# Patient Record
Sex: FEMALE | Race: WHITE | NOT HISPANIC OR LATINO | Employment: OTHER | URBAN - METROPOLITAN AREA
[De-identification: names, ages, dates, MRNs, and addresses within clinical notes are randomized per-mention and may not be internally consistent; named-entity substitution may affect disease eponyms.]

---

## 2017-04-11 ENCOUNTER — OFFICE VISIT - HEALTHEAST (OUTPATIENT)
Dept: INTERNAL MEDICINE | Facility: CLINIC | Age: 52
End: 2017-04-11

## 2017-04-11 DIAGNOSIS — M62.830 BACK MUSCLE SPASM: ICD-10-CM

## 2017-04-14 ENCOUNTER — OFFICE VISIT - HEALTHEAST (OUTPATIENT)
Dept: PHYSICAL THERAPY | Facility: REHABILITATION | Age: 52
End: 2017-04-14

## 2017-04-14 DIAGNOSIS — M62.81 MUSCLE WEAKNESS (GENERALIZED): ICD-10-CM

## 2017-04-14 DIAGNOSIS — M62.830 BACK MUSCLE SPASM: ICD-10-CM

## 2017-04-14 DIAGNOSIS — M67.00 TIGHTNESS OF HEEL CORD, UNSPECIFIED LATERALITY: ICD-10-CM

## 2017-04-14 DIAGNOSIS — M24.559 HIP FLEXOR TIGHTNESS, UNSPECIFIED LATERALITY: ICD-10-CM

## 2017-04-18 ENCOUNTER — OFFICE VISIT - HEALTHEAST (OUTPATIENT)
Dept: PHYSICAL THERAPY | Facility: REHABILITATION | Age: 52
End: 2017-04-18

## 2017-04-18 DIAGNOSIS — M24.559 HIP FLEXOR TIGHTNESS, UNSPECIFIED LATERALITY: ICD-10-CM

## 2017-04-18 DIAGNOSIS — M67.00 TIGHTNESS OF HEEL CORD, UNSPECIFIED LATERALITY: ICD-10-CM

## 2017-04-18 DIAGNOSIS — M62.830 BACK MUSCLE SPASM: ICD-10-CM

## 2017-04-18 DIAGNOSIS — M62.81 MUSCLE WEAKNESS (GENERALIZED): ICD-10-CM

## 2017-04-25 ENCOUNTER — OFFICE VISIT - HEALTHEAST (OUTPATIENT)
Dept: PHYSICAL THERAPY | Facility: REHABILITATION | Age: 52
End: 2017-04-25

## 2017-04-25 DIAGNOSIS — M62.830 BACK MUSCLE SPASM: ICD-10-CM

## 2017-04-25 DIAGNOSIS — M62.81 MUSCLE WEAKNESS (GENERALIZED): ICD-10-CM

## 2017-04-25 DIAGNOSIS — M67.00 TIGHTNESS OF HEEL CORD, UNSPECIFIED LATERALITY: ICD-10-CM

## 2017-04-25 DIAGNOSIS — M24.559 HIP FLEXOR TIGHTNESS, UNSPECIFIED LATERALITY: ICD-10-CM

## 2017-06-26 ENCOUNTER — COMMUNICATION - HEALTHEAST (OUTPATIENT)
Dept: INTERNAL MEDICINE | Facility: CLINIC | Age: 52
End: 2017-06-26

## 2017-06-26 DIAGNOSIS — E78.5 HYPERLIPIDEMIA: ICD-10-CM

## 2017-10-16 ENCOUNTER — HOSPITAL ENCOUNTER (OUTPATIENT)
Dept: MAMMOGRAPHY | Facility: HOSPITAL | Age: 52
Discharge: HOME | End: 2017-10-16
Attending: INTERNAL MEDICINE

## 2017-10-16 DIAGNOSIS — Z12.31 VISIT FOR SCREENING MAMMOGRAM: ICD-10-CM

## 2017-10-17 ENCOUNTER — OFFICE VISIT - HEALTHEAST (OUTPATIENT)
Dept: INTERNAL MEDICINE | Facility: CLINIC | Age: 52
End: 2017-10-17

## 2017-10-17 ENCOUNTER — COMMUNICATION - HEALTHEAST (OUTPATIENT)
Dept: INTERNAL MEDICINE | Facility: CLINIC | Age: 52
End: 2017-10-17

## 2017-10-17 DIAGNOSIS — Z00.00 PREVENTATIVE HEALTH CARE: ICD-10-CM

## 2017-10-17 DIAGNOSIS — E78.00 HYPERCHOLESTEROLEMIA: ICD-10-CM

## 2017-10-17 DIAGNOSIS — Z51.81 MEDICATION MONITORING ENCOUNTER: ICD-10-CM

## 2017-10-17 LAB
CHOLEST SERPL-MCNC: 200 MG/DL
FASTING STATUS PATIENT QL REPORTED: YES
HDLC SERPL-MCNC: 78 MG/DL
LDLC SERPL CALC-MCNC: 106 MG/DL
TRIGL SERPL-MCNC: 81 MG/DL

## 2017-10-17 ASSESSMENT — MIFFLIN-ST. JEOR: SCORE: 1160.14

## 2017-10-18 ENCOUNTER — AMBULATORY - HEALTHEAST (OUTPATIENT)
Dept: INTERNAL MEDICINE | Facility: CLINIC | Age: 52
End: 2017-10-18

## 2017-10-20 LAB
HPV INTERPRETATION - HISTORICAL: NORMAL
HPV INTERPRETER - HISTORICAL: NORMAL

## 2017-10-23 LAB
BKR LAB AP ABNORMAL BLEEDING: NO
BKR LAB AP BIRTH CONTROL/HORMONES: NORMAL
BKR LAB AP CERVICAL APPEARANCE: NORMAL
BKR LAB AP GYN ADEQUACY: NORMAL
BKR LAB AP GYN INTERPRETATION: NORMAL
BKR LAB AP HPV REFLEX: NORMAL
BKR LAB AP LMP: NORMAL
BKR LAB AP PATIENT STATUS: NO
BKR LAB AP PREVIOUS ABNORMAL: NORMAL
BKR LAB AP PREVIOUS NORMAL: 2014
HIGH RISK?: NO
PATH REPORT.COMMENTS IMP SPEC: NORMAL
RESULT FLAG (HE HISTORICAL CONVERSION): NORMAL

## 2018-01-08 ENCOUNTER — RECORDS - HEALTHEAST (OUTPATIENT)
Dept: ADMINISTRATIVE | Facility: OTHER | Age: 53
End: 2018-01-08

## 2018-03-29 ENCOUNTER — RECORDS - HEALTHEAST (OUTPATIENT)
Dept: ADMINISTRATIVE | Facility: OTHER | Age: 53
End: 2018-03-29

## 2018-07-10 ENCOUNTER — COMMUNICATION - HEALTHEAST (OUTPATIENT)
Dept: INTERNAL MEDICINE | Facility: CLINIC | Age: 53
End: 2018-07-10

## 2018-08-19 ENCOUNTER — COMMUNICATION - HEALTHEAST (OUTPATIENT)
Dept: INTERNAL MEDICINE | Facility: CLINIC | Age: 53
End: 2018-08-19

## 2018-08-19 DIAGNOSIS — E78.5 HYPERLIPIDEMIA: ICD-10-CM

## 2018-08-19 DIAGNOSIS — E78.00 HYPERCHOLESTEROLEMIA: ICD-10-CM

## 2018-10-04 ENCOUNTER — RECORDS - HEALTHEAST (OUTPATIENT)
Dept: ADMINISTRATIVE | Facility: OTHER | Age: 53
End: 2018-10-04

## 2018-10-09 ENCOUNTER — OFFICE VISIT - HEALTHEAST (OUTPATIENT)
Dept: INTERNAL MEDICINE | Facility: CLINIC | Age: 53
End: 2018-10-09

## 2018-10-09 DIAGNOSIS — E78.00 HYPERCHOLESTEROLEMIA: ICD-10-CM

## 2018-10-09 DIAGNOSIS — Z00.00 PREVENTATIVE HEALTH CARE: ICD-10-CM

## 2018-10-09 LAB
ALBUMIN SERPL-MCNC: 4.1 G/DL (ref 3.5–5)
ALP SERPL-CCNC: 60 U/L (ref 45–120)
ALT SERPL W P-5'-P-CCNC: 16 U/L (ref 0–45)
ANION GAP SERPL CALCULATED.3IONS-SCNC: 9 MMOL/L (ref 5–18)
AST SERPL W P-5'-P-CCNC: 20 U/L (ref 0–40)
BILIRUB SERPL-MCNC: 0.9 MG/DL (ref 0–1)
BUN SERPL-MCNC: 14 MG/DL (ref 8–22)
CALCIUM SERPL-MCNC: 9.9 MG/DL (ref 8.5–10.5)
CHLORIDE BLD-SCNC: 104 MMOL/L (ref 98–107)
CHOLEST SERPL-MCNC: 213 MG/DL
CO2 SERPL-SCNC: 29 MMOL/L (ref 22–31)
CREAT SERPL-MCNC: 0.79 MG/DL (ref 0.6–1.1)
ERYTHROCYTE [DISTWIDTH] IN BLOOD BY AUTOMATED COUNT: 11.6 % (ref 11–14.5)
FASTING STATUS PATIENT QL REPORTED: YES
GFR SERPL CREATININE-BSD FRML MDRD: >60 ML/MIN/1.73M2
GLUCOSE BLD-MCNC: 86 MG/DL (ref 70–125)
HCT VFR BLD AUTO: 40.6 % (ref 35–47)
HDLC SERPL-MCNC: 86 MG/DL
HGB BLD-MCNC: 13.8 G/DL (ref 12–16)
LDLC SERPL CALC-MCNC: 109 MG/DL
MCH RBC QN AUTO: 31.1 PG (ref 27–34)
MCHC RBC AUTO-ENTMCNC: 34.1 G/DL (ref 32–36)
MCV RBC AUTO: 91 FL (ref 80–100)
PLATELET # BLD AUTO: 210 THOU/UL (ref 140–440)
PMV BLD AUTO: 8.5 FL (ref 7–10)
POTASSIUM BLD-SCNC: 4.4 MMOL/L (ref 3.5–5)
PROT SERPL-MCNC: 6.8 G/DL (ref 6–8)
RBC # BLD AUTO: 4.45 MILL/UL (ref 3.8–5.4)
SODIUM SERPL-SCNC: 142 MMOL/L (ref 136–145)
TRIGL SERPL-MCNC: 91 MG/DL
WBC: 6.4 THOU/UL (ref 4–11)

## 2018-10-09 ASSESSMENT — MIFFLIN-ST. JEOR: SCORE: 1167.4

## 2018-10-10 LAB
25(OH)D3 SERPL-MCNC: 40.7 NG/ML (ref 30–80)
25(OH)D3 SERPL-MCNC: 40.7 NG/ML (ref 30–80)

## 2018-11-25 ENCOUNTER — COMMUNICATION - HEALTHEAST (OUTPATIENT)
Dept: INTERNAL MEDICINE | Facility: CLINIC | Age: 53
End: 2018-11-25

## 2018-11-25 DIAGNOSIS — E78.5 HYPERLIPIDEMIA: ICD-10-CM

## 2018-12-05 ENCOUNTER — HOSPITAL ENCOUNTER (OUTPATIENT)
Dept: MAMMOGRAPHY | Facility: CLINIC | Age: 53
Discharge: HOME | End: 2018-12-05
Attending: INTERNAL MEDICINE

## 2018-12-05 DIAGNOSIS — Z12.31 VISIT FOR SCREENING MAMMOGRAM: ICD-10-CM

## 2019-04-02 ENCOUNTER — RECORDS - HEALTHEAST (OUTPATIENT)
Dept: ADMINISTRATIVE | Facility: OTHER | Age: 54
End: 2019-04-02

## 2019-04-05 ENCOUNTER — RECORDS - HEALTHEAST (OUTPATIENT)
Dept: ADMINISTRATIVE | Facility: OTHER | Age: 54
End: 2019-04-05

## 2019-10-11 ENCOUNTER — AMBULATORY - HEALTHEAST (OUTPATIENT)
Dept: SCHEDULING | Facility: CLINIC | Age: 54
End: 2019-10-11

## 2019-10-11 ENCOUNTER — OFFICE VISIT - HEALTHEAST (OUTPATIENT)
Dept: INTERNAL MEDICINE | Facility: CLINIC | Age: 54
End: 2019-10-11

## 2019-10-11 DIAGNOSIS — M85.80 LOW BONE MASS: ICD-10-CM

## 2019-10-11 DIAGNOSIS — Z00.00 ENCOUNTER FOR PREVENTIVE HEALTH EXAMINATION: ICD-10-CM

## 2019-10-11 DIAGNOSIS — E78.00 HYPERCHOLESTEROLEMIA: ICD-10-CM

## 2019-10-11 LAB
ALBUMIN SERPL-MCNC: 4.2 G/DL (ref 3.5–5)
ALP SERPL-CCNC: 76 U/L (ref 45–120)
ALT SERPL W P-5'-P-CCNC: 14 U/L (ref 0–45)
ANION GAP SERPL CALCULATED.3IONS-SCNC: 10 MMOL/L (ref 5–18)
AST SERPL W P-5'-P-CCNC: 21 U/L (ref 0–40)
BILIRUB SERPL-MCNC: 0.7 MG/DL (ref 0–1)
BUN SERPL-MCNC: 13 MG/DL (ref 8–22)
CALCIUM SERPL-MCNC: 10.5 MG/DL (ref 8.5–10.5)
CHLORIDE BLD-SCNC: 104 MMOL/L (ref 98–107)
CHOLEST SERPL-MCNC: 199 MG/DL
CO2 SERPL-SCNC: 28 MMOL/L (ref 22–31)
CREAT SERPL-MCNC: 0.88 MG/DL (ref 0.6–1.1)
ERYTHROCYTE [DISTWIDTH] IN BLOOD BY AUTOMATED COUNT: 11.1 % (ref 11–14.5)
FASTING STATUS PATIENT QL REPORTED: YES
GFR SERPL CREATININE-BSD FRML MDRD: >60 ML/MIN/1.73M2
GLUCOSE BLD-MCNC: 106 MG/DL (ref 70–125)
HCT VFR BLD AUTO: 43.6 % (ref 35–47)
HDLC SERPL-MCNC: 79 MG/DL
HGB BLD-MCNC: 14.8 G/DL (ref 12–16)
LDLC SERPL CALC-MCNC: 101 MG/DL
MCH RBC QN AUTO: 31.9 PG (ref 27–34)
MCHC RBC AUTO-ENTMCNC: 33.8 G/DL (ref 32–36)
MCV RBC AUTO: 94 FL (ref 80–100)
PLATELET # BLD AUTO: 251 THOU/UL (ref 140–440)
PMV BLD AUTO: 8.3 FL (ref 7–10)
POTASSIUM BLD-SCNC: 4.3 MMOL/L (ref 3.5–5)
PROT SERPL-MCNC: 7.3 G/DL (ref 6–8)
RBC # BLD AUTO: 4.63 MILL/UL (ref 3.8–5.4)
SODIUM SERPL-SCNC: 142 MMOL/L (ref 136–145)
TRIGL SERPL-MCNC: 95 MG/DL
WBC: 8.6 THOU/UL (ref 4–11)

## 2019-10-11 ASSESSMENT — MIFFLIN-ST. JEOR: SCORE: 1151.64

## 2019-10-14 LAB
25(OH)D3 SERPL-MCNC: 55.7 NG/ML (ref 30–80)
25(OH)D3 SERPL-MCNC: 55.7 NG/ML (ref 30–80)

## 2019-12-15 ENCOUNTER — COMMUNICATION - HEALTHEAST (OUTPATIENT)
Dept: INTERNAL MEDICINE | Facility: CLINIC | Age: 54
End: 2019-12-15

## 2019-12-15 DIAGNOSIS — E78.5 HYPERLIPIDEMIA: ICD-10-CM

## 2019-12-26 ENCOUNTER — HOSPITAL ENCOUNTER (OUTPATIENT)
Dept: MAMMOGRAPHY | Facility: CLINIC | Age: 54
Discharge: HOME | End: 2019-12-26
Attending: INTERNAL MEDICINE

## 2019-12-26 DIAGNOSIS — Z12.31 VISIT FOR SCREENING MAMMOGRAM: ICD-10-CM

## 2020-10-02 ENCOUNTER — COMMUNICATION - HEALTHEAST (OUTPATIENT)
Dept: INTERNAL MEDICINE | Facility: CLINIC | Age: 55
End: 2020-10-02

## 2020-10-02 ENCOUNTER — OFFICE VISIT - HEALTHEAST (OUTPATIENT)
Dept: INTERNAL MEDICINE | Facility: CLINIC | Age: 55
End: 2020-10-02

## 2020-10-02 DIAGNOSIS — Z11.59 NEED FOR HEPATITIS C SCREENING TEST: ICD-10-CM

## 2020-10-02 DIAGNOSIS — Z00.00 ENCOUNTER FOR PREVENTIVE CARE: ICD-10-CM

## 2020-10-02 DIAGNOSIS — Z11.4 ENCOUNTER FOR SCREENING FOR HIV: ICD-10-CM

## 2020-10-02 DIAGNOSIS — E78.00 HYPERCHOLESTEROLEMIA: ICD-10-CM

## 2020-10-02 LAB
ALBUMIN SERPL-MCNC: 4.2 G/DL (ref 3.5–5)
ALP SERPL-CCNC: 54 U/L (ref 45–120)
ALT SERPL W P-5'-P-CCNC: 17 U/L (ref 0–45)
ANION GAP SERPL CALCULATED.3IONS-SCNC: 9 MMOL/L (ref 5–18)
AST SERPL W P-5'-P-CCNC: 23 U/L (ref 0–40)
BILIRUB SERPL-MCNC: 0.5 MG/DL (ref 0–1)
BUN SERPL-MCNC: 18 MG/DL (ref 8–22)
CALCIUM SERPL-MCNC: 9.4 MG/DL (ref 8.5–10.5)
CHLORIDE BLD-SCNC: 105 MMOL/L (ref 98–107)
CHOLEST SERPL-MCNC: 225 MG/DL
CO2 SERPL-SCNC: 28 MMOL/L (ref 22–31)
CREAT SERPL-MCNC: 0.86 MG/DL (ref 0.6–1.1)
ERYTHROCYTE [DISTWIDTH] IN BLOOD BY AUTOMATED COUNT: 12 % (ref 11–14.5)
FASTING STATUS PATIENT QL REPORTED: YES
GFR SERPL CREATININE-BSD FRML MDRD: >60 ML/MIN/1.73M2
GLUCOSE BLD-MCNC: 86 MG/DL (ref 70–125)
HCT VFR BLD AUTO: 44 % (ref 35–47)
HCV AB SERPL QL IA: NEGATIVE
HDLC SERPL-MCNC: 88 MG/DL
HGB BLD-MCNC: 14.4 G/DL (ref 12–16)
HIV 1+2 AB+HIV1 P24 AG SERPL QL IA: NEGATIVE
LDLC SERPL CALC-MCNC: 119 MG/DL
MCH RBC QN AUTO: 30.8 PG (ref 27–34)
MCHC RBC AUTO-ENTMCNC: 32.8 G/DL (ref 32–36)
MCV RBC AUTO: 94 FL (ref 80–100)
PLATELET # BLD AUTO: 239 THOU/UL (ref 140–440)
PMV BLD AUTO: 8.3 FL (ref 7–10)
POTASSIUM BLD-SCNC: 4.8 MMOL/L (ref 3.5–5)
PROT SERPL-MCNC: 6.8 G/DL (ref 6–8)
RBC # BLD AUTO: 4.69 MILL/UL (ref 3.8–5.4)
SODIUM SERPL-SCNC: 142 MMOL/L (ref 136–145)
TRIGL SERPL-MCNC: 92 MG/DL
TSH SERPL DL<=0.005 MIU/L-ACNC: 1.48 UIU/ML (ref 0.3–5)
WBC: 5.7 THOU/UL (ref 4–11)

## 2020-10-02 RX ORDER — TRETINOIN 0.5 MG/G
CREAM TOPICAL
Status: SHIPPED | COMMUNITY
Start: 2020-09-23 | End: 2022-03-18

## 2020-10-02 ASSESSMENT — MIFFLIN-ST. JEOR: SCORE: 1122.15

## 2020-10-05 LAB
25(OH)D3 SERPL-MCNC: 52.4 NG/ML (ref 30–80)
25(OH)D3 SERPL-MCNC: 52.4 NG/ML (ref 30–80)

## 2020-12-18 ENCOUNTER — COMMUNICATION - HEALTHEAST (OUTPATIENT)
Dept: INTERNAL MEDICINE | Facility: CLINIC | Age: 55
End: 2020-12-18

## 2020-12-18 DIAGNOSIS — E78.5 HYPERLIPIDEMIA: ICD-10-CM

## 2020-12-21 RX ORDER — ATORVASTATIN CALCIUM 10 MG/1
TABLET, FILM COATED ORAL
Qty: 90 TABLET | Refills: 3 | Status: SHIPPED | OUTPATIENT
Start: 2020-12-21 | End: 2022-02-01

## 2020-12-28 ENCOUNTER — HOSPITAL ENCOUNTER (OUTPATIENT)
Dept: MAMMOGRAPHY | Facility: CLINIC | Age: 55
Discharge: HOME | End: 2020-12-28
Attending: INTERNAL MEDICINE

## 2020-12-28 DIAGNOSIS — Z12.31 VISIT FOR SCREENING MAMMOGRAM: ICD-10-CM

## 2021-04-23 ENCOUNTER — COMMUNICATION - HEALTHEAST (OUTPATIENT)
Dept: INTERNAL MEDICINE | Facility: CLINIC | Age: 56
End: 2021-04-23

## 2021-05-27 ENCOUNTER — RECORDS - HEALTHEAST (OUTPATIENT)
Dept: ADMINISTRATIVE | Facility: CLINIC | Age: 56
End: 2021-05-27

## 2021-05-28 ENCOUNTER — RECORDS - HEALTHEAST (OUTPATIENT)
Dept: ADMINISTRATIVE | Facility: CLINIC | Age: 56
End: 2021-05-28

## 2021-05-29 ENCOUNTER — RECORDS - HEALTHEAST (OUTPATIENT)
Dept: ADMINISTRATIVE | Facility: CLINIC | Age: 56
End: 2021-05-29

## 2021-05-30 ENCOUNTER — RECORDS - HEALTHEAST (OUTPATIENT)
Dept: ADMINISTRATIVE | Facility: CLINIC | Age: 56
End: 2021-05-30

## 2021-05-30 VITALS — BODY MASS INDEX: 25.58 KG/M2 | WEIGHT: 137.6 LBS

## 2021-05-31 VITALS — HEIGHT: 62 IN | WEIGHT: 136 LBS | BODY MASS INDEX: 25.03 KG/M2

## 2021-06-02 ENCOUNTER — RECORDS - HEALTHEAST (OUTPATIENT)
Dept: ADMINISTRATIVE | Facility: CLINIC | Age: 56
End: 2021-06-02

## 2021-06-02 VITALS — WEIGHT: 137.6 LBS | HEIGHT: 62 IN | BODY MASS INDEX: 25.32 KG/M2

## 2021-06-02 NOTE — PROGRESS NOTES
Assessment and Plan:     1. Encounter for preventive health examination  Up-to-date on mammography, colon cancer screening and immunizations.  Due for flu shot but has a cold.  Additionally, due for bone density within the next 1 to 2 years.  Lifestyle is healthy.  Weight is stable.  She enjoys jogging and running.  She is going to begin strength training.  Weight and diet are healthy.    2. Hypercholesterolemia  We will update labs  - HM2(CBC w/o Differential)  - Comprehensive Metabolic Panel  - Lipid Cascade FASTING    3. Low bone mass  Will update labs  - Vitamin D, Total (25-Hydroxy)  - DXA Bone Density Scan; Future      Annual physical    Lulazack Landrum MD  10/11/2019    Chief Complaint:  Chief Complaint   Patient presents with     Annual Exam       History of Present Illness:  Birtney is a 53 y.o. female who is here today for an annual physical examination.  Of note, in general, she enjoys excellent health.  She has no complaints today.  She does express concern regarding her daughter who is a patient of mine.  We will be meeting with Lalita on Tuesday of next week to discuss her issues.    There is no new family history.  Britney has no chief complaints.    Health maintenance is reviewed and updated in the chart    Lifestyle is reviewed: She is exercising regularly via jogging and walking.  She eats a healthful diet.      Review of Systems:   A comprehensive review was performed.  She denies any gynecologic symptoms, chest pain or shortness of breath.  There are no new bowel or bladder symptoms.  The rest of the review of systems are negative for all systems.    PFSH:  Social History:  with adult children.  She continues to work full-time  Social History     Tobacco Use   Smoking Status Never Smoker   Smokeless Tobacco Never Used       Past Medical History:   Past Medical History:   Diagnosis Date     Breast cyst 2010     Chest pain        Allergies   Allergen Reactions     Simvastatin        Family  "History: Her daughter has generalized anxiety but nothing else  Family History   Problem Relation Age of Onset     Lung cancer Father      Hyperlipidemia Mother      Hypertension Mother      Anxiety disorder Mother      Graves' disease Sister      ADD / ADHD Daughter      Depression Daughter        Physical Exam:  Vitals:    10/11/19 0834   BP: 102/74   Patient Site: Left Arm   Patient Position: Sitting   Cuff Size: Adult Regular   Pulse: 88   SpO2: 98%   Weight: 135 lb (61.2 kg)   Height: 5' 1.75\" (1.568 m)     Wt Readings from Last 3 Encounters:   10/11/19 135 lb (61.2 kg)   10/09/18 137 lb 9.6 oz (62.4 kg)   10/17/17 136 lb (61.7 kg)       General Appearance:  Alert, cooperative, no distress, appears stated age   Head:  Normocephalic, without obvious abnormality, atraumatic   Eyes:  PERRL, conjunctiva/corneas clear, EOM's intact   Ears:  Normal TM's and external ear canals, both ears   Nose: Nares normal, septum midline,mucosa normal, no drainage    Throat: Lips, mucosa, and tongue normal; teeth and gums normal   Neck: Supple, symmetrical, trachea midline, no adenopathy;  thyroid: not enlarged, symmetric, no tenderness/mass/nodules; no carotid bruit or JVD   Back:   Symmetric, no curvature, ROM normal, no CVA tenderness   Lungs:   Clear to auscultation bilaterally, respirations unlabored   Heart:  Regular rate and rhythm, S1 and S2 normal, no murmur, rub, or gallop   Abdomen:   Soft, non-tender, bowel sounds active all four quadrants,  no masses, no organomegaly, no hernias    Extremities: Extremities normal, atraumatic, no cyanosis or edema   Skin: Skin color, texture, turgor normal, no rashes or lesions   Lymph nodes: Cervical, supraclavicular, and axillary nodes normal   Neurologic: Normal, CN 2-12 intact  Breasts; within normal limits     Medications:  Current Outpatient Medications   Medication Sig Dispense Refill     aspirin 81 MG EC tablet Take 81 mg by mouth daily.       atorvastatin (LIPITOR) 10 MG " tablet TAKE 1 TABLET BY MOUTH ONCE DAILY 90 tablet 3     calcium citrate-vitamin D (CITRACAL+D) 315-200 mg-unit per tablet Take 1 tablet by mouth 2 (two) times a day.       multivitamin capsule Take 1 capsule by mouth daily.       omega-3 fatty acids-fish oil (FISH OIL) 360-1,200 mg cap Take 1 capsule by mouth daily.       No current facility-administered medications for this visit.        Immunizations:  Immunization History   Administered Date(s) Administered     DTP 04/01/1965, 08/05/1970, 11/30/1975, 07/30/1985     Hep A / Hep B 02/17/2014, 03/17/2014, 08/19/2014     INFLUENZA,RECOMBINANT,INJ,PF QUADRIVALENT 18+YRS 10/09/2018     Influenza, inj, historic,unspecified 11/01/2007, 10/18/2012, 09/11/2013     Influenza,seasonal,quad inj =/> 6months 10/14/2016, 10/17/2017     MMR 05/12/1966, 03/19/1990     OPV,Trivalent,Historic(4366-3905 only) 06/10/1965, 08/05/1970     Td, Adult, Absorbed 07/30/2003     Td, adult adsorbed, PF 10/09/2018     Td,adult,historic,unspecified 07/22/2008     Tdap 07/22/2008     Typhoid, Inj, Inactive 02/17/2014     ZOSTER, RECOMBINANT, IM 03/31/2019, 06/09/2019

## 2021-06-03 VITALS
DIASTOLIC BLOOD PRESSURE: 74 MMHG | WEIGHT: 135 LBS | HEART RATE: 88 BPM | BODY MASS INDEX: 24.84 KG/M2 | SYSTOLIC BLOOD PRESSURE: 102 MMHG | HEIGHT: 62 IN | OXYGEN SATURATION: 98 %

## 2021-06-04 NOTE — TELEPHONE ENCOUNTER
Refill Approved    Rx renewed per Medication Renewal Policy. Medication was last renewed on 11/27/18.    Kassie Dolan, [facility] Triage/Med Refill 12/16/2019     Requested Prescriptions   Pending Prescriptions Disp Refills     atorvastatin (LIPITOR) 10 MG tablet [Pharmacy Med Name: Atorvastatin Calcium Oral Tablet 10 MG] 90 tablet 2     Sig: TAKE 1 TABLET BY MOUTH ONCE DAILY       Statins Refill Protocol (Hmg CoA Reductase Inhibitors) Passed - 12/15/2019 11:18 AM        Passed - PCP or prescribing provider visit in past 12 months      Last office visit with prescriber/PCP: 4/11/2017 Lula Landrum MD OR same dept: Visit date not found OR same specialty: 4/11/2017 Lula Landrum MD  Last physical: 10/11/2019 Last MTM visit: Visit date not found   Next visit within 3 mo: Visit date not found  Next physical within 3 mo: Visit date not found  Prescriber OR PCP: Lula Landrum MD  Last diagnosis associated with med order: 1. Hyperlipidemia  - atorvastatin (LIPITOR) 10 MG tablet [Pharmacy Med Name: Atorvastatin Calcium Oral Tablet 10 MG]; TAKE 1 TABLET BY MOUTH ONCE DAILY  Dispense: 90 tablet; Refill: 2    If protocol passes may refill for 12 months if within 3 months of last provider visit (or a total of 15 months).

## 2021-06-05 VITALS
WEIGHT: 128.5 LBS | RESPIRATION RATE: 16 BRPM | SYSTOLIC BLOOD PRESSURE: 110 MMHG | HEART RATE: 98 BPM | HEIGHT: 62 IN | DIASTOLIC BLOOD PRESSURE: 80 MMHG | BODY MASS INDEX: 23.65 KG/M2 | OXYGEN SATURATION: 98 %

## 2021-06-10 NOTE — PROGRESS NOTES
[facility] Daily Progress     Patient Name: Britney Groves  PRECAUTIONS/RESTRICTIONS:  Sinus Tachycardia  Date: 2017  Visit #:3  PTA visit #:  0  Referral Diagnosis: Back Muscle Spasms  Referring provider: Lula Landrum MD  Visit Diagnosis:     ICD-10-CM    1. Back muscle spasm M62.830    2. Muscle weakness (generalized) M62.81    3. Tightness of heel cord, unspecified laterality M67.00    4. Hip flexor tightness, unspecified laterality M24.559          Assessment:     HEP/POC compliance is  good .  Patient demonstrates understanding/independence with home program.  Response to Intervention excellent.   Able to run 5 miles without symptoms and drive 2+ hours following run without discomforts as has been noted in the past.  Exceeding expectations.  RONNIE Outcome Measure demonstrated a statistically significant change.      Goal Status:    Pt. will demonstrate/verbalize independence in self-management of condition in : Met  Pt. will be independent with home exercise program in : Met  Pt. will decrease use of medication for pain for improved quality of life in : Met  Comment:: decrease use of Aleve and muscle relaxer  Pt. will have improved quality of sleep: Met (no difficulty finding a comfortable position to get to sleep)  Comment:: be able to find a comfortable position with greater ease and decrease interruptions to 1-2x/night  Patient will return to: Met  other activity: normal ADL activity for household chores and running/training normally  Patient will decrease : Met  by ___ points: 12    Plan / Patient Education:     Patient is doing well and feels she can manage condition with HEP.  I will hold her chart for 30 days and if contact from patient before then will DC at 30 days.   Patient may look into a running evaluation in the future if she desires.  She will run a 7 mile race on 2017.    Subjective:     Pain Ratin    Able to run 5 miles without symptoms and drive 2+ hours following  "run without discomforts as has been noted in the past.    All goals met.  No functional limitations.    RONNIE Outcome Measure:  Initial 26%  Current 0%.    Objective:     Moving without guarded motion.  No pain.     Lumbar ROM:            Date: 4/14/2017 4/25/2017      *Indicate scale AROM AROM AROM   Lumbar Flexion Min loss due to HS tightness, NE Nil loss, NE      Lumbar Extension Nil loss, NE Nil loss, NE        Right Left Right Left Right Left   Lumbar Sidebending Nil loss, NE Nil loss, NE  Nil loss, NE Nil loss, Ne        Lumbar Rotation Min loss, NE Min loss, NE  Nil loss, NE Nil loss, Ne       Thoracic Flexion Nil loss, NE       Thoracic Extension Nil loss, NE       Thoracic Sidebending               Thoracic Rotation                   Complete HEP:  Exercises:  Exercise #1: Gastrocsoleus stretch, 30\"x1 each-H  Comment #1: Standing/seated HS stretch, 30\"x1 each-H  Exercise #2: HS stretch with heel on doorway, trial, NE  Comment #2: Quad/hip flexor stretch at standing, demonstrated-H  Exercise #3: 1/2 kneel hip flexor stretch with glut set, 30\"x1 each-H  Comment #3: Adductor stretch at standing, 5\"x1 each-H  Exercise #4: Seated Hip adductor stretch, 5\"x1-H  Comment #4: Seated chair trunk rotation, 5\"x1 each-H  Exercise #5: SL trunk rotation stretch with OP at knees, 5\"x1 each-H  Comment #5: Instruction in body mechanics  Exercise #6: Jyoti poses with bias right and left and neutral, 30\"x1 each  Comment #6: Prone lyingx1'-H  Exercise #7: Prone on forearms, 1'-H  Comment #7: REIL, 10x + 2 blow and sags  Exercise #8: SL bent knee planks, 30\" each-H  Comment #8: Prone plank with extended legs , 30\"x1-H  Exercise #9: Jyoti pose, 20\"-H      Tolerated manual therapy and exercise well.    Treatment Today    TREATMENT MINUTES COMMENTS   Evaluation     Self-care/ Home management     Manual therapy 10 Supine:  Bilat MFR iliopsoas.   Neuromuscular Re-education  See flow sheet   Therapeutic Activity     Therapeutic " Exercises 10 See flow sheet   Gait training     Modality__________________     ROM assessment 5 back         Total 25    Blank areas are intentional and mean the treatment did not include these items.       Monique Andersen  4/25/2017       [facility] Discharge Summary  Patient Name: Britney Groves  Date: 6/9/2017  Referral Diagnosis: Back Muscle Spasms  Referring provider: Lula Landrum MD  Visit Diagnosis:   1. Back muscle spasm     2. Muscle weakness (generalized)     3. Tightness of heel cord, unspecified laterality     4. Hip flexor tightness, unspecified laterality         Goals:  Pt. will demonstrate/verbalize independence in self-management of condition in : Met  Pt. will be independent with home exercise program in : Met  Pt. will decrease use of medication for pain for improved quality of life in : Met  Comment:: decrease use of Aleve and muscle relaxer  Pt. will have improved quality of sleep: Met (no difficulty finding a comfortable position to get to sleep)  Comment:: be able to find a comfortable position with greater ease and decrease interruptions to 1-2x/night  Patient will return to: Met  other activity: normal ADL activity for household chores and running/training normally  Patient will decrease : Met  by ___ points: 12    Patient was seen for 3 visits from 4/14/2017 to 4/25/2017 with 1 missed appointment.  The patient met goals and has demonstrated understanding of and independence in the home program for self-care, and progression to next steps.  She will initiate contact if questions or concerns arise.  The patient reports feeling better and did not wish to schedule further therapy at this time.  Patient received a home program LE/lumbar/thoracic stretches/core strength and back extension    Therapy will be discontinued at this time.  The patient will need a new referral to resume.    Thank you for your referral.  Monique Andersen  6/9/2017  2:05 PM

## 2021-06-10 NOTE — PROGRESS NOTES
[facility] Daily Progress     Patient Name: Britney Groves  PRECAUTIONS/RESTRICTIONS:  Sinus Tachycardia  Date: 4/18/2017  Visit #: 2  PTA visit #:  0  Referral Diagnosis: Back Muscle Spasms  Referring provider: Lula Landrum MD  Visit Diagnosis:     ICD-10-CM    1. Back muscle spasm M62.830    2. Muscle weakness (generalized) M62.81    3. Tightness of heel cord, unspecified laterality M67.00    4. Hip flexor tightness, unspecified laterality M24.559          Assessment:     HEP/POC compliance is  good .  Patient demonstrates understanding/independence with home program.  Response to Intervention good  Patient is benefitting from skilled physical therapy and is making steady progress toward functional goals.  Patient is appropriate to continue with skilled physical therapy intervention, as indicated by initial plan of care.  Patient is progressing appropriately regarding strength, mobility and symptom management.    Goal Status:  ongoing  Pt. will demonstrate/verbalize independence in self-management of condition in : 6 weeks  Pt. will be independent with home exercise program in : 6 weeks  Pt. will decrease use of medication for pain for improved quality of life in : 6 weeks;Comment  Comment:: decrease use of Aleve and muscle relaxer  Pt. will have improved quality of sleep: with less pain;waking less times/night;in 6 weeks;Comment  Comment:: be able to find a comfortable position with greater ease and decrease interruptions to 1-2x/night  Patient will return to: exercise;sport;other activity;for full duty;in 6 weeks  other activity: normal ADL activity for household chores and running/training normally  Patient will decrease : RONNIE score;by _ points;for improved quality of function;for improved quality of life;in 6 weeks  by ___ points: 12    Plan / Patient Education:     Continue with initial plan of care.  Progress with home program as tolerated.   Will assess response to iliopsoas MFR and add  "core strength prone and or all's core UE LE lifts.    Patient was given contact for running evaluation per Dr. Landrum recommendation.    Subjective:     Pain Ratin  Worst pain over weekend was 2/10.  Obtained foot orthotics for regular and running shoes.  Significant improvement performing stretches after the run and with STM on .      Response to PT/benefits/improvements:  Generally not taking any medication for pain management.  Was able to do a 5 mile run and felt good after stretching.      Continued difficulties:  Currently none.    Objective:     Moving without guarded motion.  No pain.    Today's Exercises:    OPT EXERCISES 2017   Comment #5 Instruction in body mechanics   Exercise #6 Jyoti poses with bias right and left and neutral, 30\"x1 each   Comment #6 Prone lyingx1'-H   Exercise #7 Prone on forearms, 1'-H   Comment #7 REIL, 10x + 2 blow and sags     Tolerated manual therapy and exercise well.    Treatment Today    TREATMENT MINUTES COMMENTS   Evaluation     Self-care/ Home management     Manual therapy 10 Supine:  Bilat MFR iliopsoas.   Neuromuscular Re-education 5 See flow sheet   Therapeutic Activity     Therapeutic Exercises 10 See flow sheet   Gait training     Modality__________________                Total 25    Blank areas are intentional and mean the treatment did not include these items.       Monique Andersen  2017    "

## 2021-06-10 NOTE — PROGRESS NOTES
[facility] Follow Up Note    Assessment/Plan:  1. Back muscle spasm  Lower thoracic area.  No neurologic deficits  Recommendation: Avoid weight lifting exercises such as leg presses.  Avoid sit ups and planks.  Stretch before and after light jogging.  Continue with regular walks.  Consider massage.  Therapeutic recommendation: If pain is exacerbated use ice followed by heat.  Small prescription for cyclobenzaprine to be used at bedtime for spasm.  Referral to physical therapy for valuation and treatment of muscle spasm.  Also, it would be helpful to have physical therapist assess gait and running style as her pain is more focused on the left.  Make sure running shoes are up-to-date.  Neurologic impairment signs and symptoms were reviewed.  She should contact me immediately should these occur  - Ambulatory referral to Physical Therapy        Lula Landrum MD    Chief Complaint:  Chief Complaint   Patient presents with     Back Pain       History of Present Illness:  Britney is a 51 y.o. female who is here today for evaluation of thoracic back pain.  Of note, she and her  have been engaging in a variety of new training exercises.  She has recently run the [employer] race.  She and her  will be doing a marathon relay in 1 month.  She has been running 3 miles 2-3 days per week.  She ran a 7 mile run over the weekend.  She states after that she developed a pain in her lower thoracic and upper lumbar spine.  Her  reported that he noted a bulge over the area.  She states the area was somewhat hard.  The pain to her is slightly to the left of midline.  There is no associated numbness or tingling of the upper or lower extremities.  No gait disturbances are noted.  She does not have pain with running or walking.  Training activities include weight training.  She is doing leg presses and sit ups on a balance ball.  She is also doing planks and other types of weight lifting.  With  regard to palliation, she has tried ibuprofen with some relief.  She is currently pain-free    Review of Systems:  A comprehensive review of systems was performed and was otherwise negative    PFSH:  Social History: She is  with adult children  History   Smoking Status     Never Smoker   Smokeless Tobacco     Not on file       Past History: Hyperlipidemia  Current Outpatient Prescriptions   Medication Sig Dispense Refill     aspirin 81 MG EC tablet Take 81 mg by mouth daily.       atorvastatin (LIPITOR) 10 MG tablet TAKE 1 TABLET BY MOUTH DAILY 90 tablet 3     calcium citrate-vitamin D (CITRACAL+D) 315-200 mg-unit per tablet Take 1 tablet by mouth 2 (two) times a day.       multivitamin capsule Take 1 capsule by mouth daily.       omega-3 fatty acids-fish oil (FISH OIL) 360-1,200 mg cap Take 1 capsule by mouth daily.       cyclobenzaprine (FLEXERIL) 5 MG tablet Take 1 tablet (5 mg total) by mouth at bedtime as needed for muscle spasms. 14 tablet 0     No current facility-administered medications for this visit.        Family History: Noncontributory    Physical Exam:  General Appearance:   She appears at baseline and in no acute distress  Vitals:    04/11/17 1018   BP: 124/72   Patient Site: Left Arm   Patient Position: Sitting   Cuff Size: Adult Regular   Pulse: (!) 56   Weight: 137 lb 9.6 oz (62.4 kg)     Wt Readings from Last 3 Encounters:   04/11/17 137 lb 9.6 oz (62.4 kg)   10/14/16 135 lb (61.2 kg)   10/13/15 134 lb (60.8 kg)     Body mass index is 25.58 kg/(m^2).    Back is palpated.  At the area of the lower thoracic and upper lumbar vertebrae there is some mild tenderness to palpation to the left of midline.  Straight leg raising is negative.  Flexion of the hips is unremarkable.  Reflexes are symmetric.  Gait and station are intact

## 2021-06-10 NOTE — PROGRESS NOTES
[facility]   [facility]-Pelvic Initial Evaluation    Patient Name: Britney Groves  PRECAUTIONS/RESTRICTIONS:  Sinus Tachycardia  Date of evaluation: 4/14/2017  Referral Diagnosis: Back Muscle Spasm  Referring provider: Lula Landrum MD  Visit Diagnosis:     ICD-10-CM    1. Back muscle spasm M62.830    2. Muscle weakness (generalized) M62.81    3. Tightness of heel cord, unspecified laterality M67.00    4. Hip flexor tightness, unspecified laterality M24.559        Assessment:      Skilled PT is required to modulate pain, decrease spasm, increase flexibility, strength and function through modalities, manual therapy, exercise and education..  Pt. is appropriate for skilled PT intervention as outlined in the Plan of Care (POC).  Pt. is a good candidate for skilled PT services to improve pain levels and function.    Goals:  Pt. will demonstrate/verbalize independence in self-management of condition in : 6 weeks  Pt. will be independent with home exercise program in : 6 weeks  Pt. will decrease use of medication for pain for improved quality of life in : 6 weeks;Comment  Comment:: decrease use of Aleve and muscle relaxer  Pt. will have improved quality of sleep: with less pain;waking less times/night;in 6 weeks;Comment  Comment:: be able to find a comfortable position with greater ease and decrease interruptions to 1-2x/night  Patient will return to: exercise;sport;other activity;for full duty;in 6 weeks  other activity: normal ADL activity for household chores and running/training normally  Patient will decrease : RONNIE score;by _ points;for improved quality of function;for improved quality of life;in 6 weeks  by ___ points: 12    Patient's expectations/goals are realistic.    Barriers to Learning or Achieving Goals:  No Barriers.       Plan / Patient Instructions:        Plan of Care:   Authorization / Certification Number of Visits: 20/calendar year  Communication with: Referral Source  Patient Related  Instruction: Nature of Condition;Treatment plan and rationale;Self Care instruction;Basis of treatment;Body mechanics;Posture;Precautions;Next steps;Expected outcome  Times per Week: 2  Number of Weeks: 6  Number of Visits: 12  Discharge Planning: Independent HEP and self-management of symptoms  Precautions / Restrictions : Sinus Tachycardia  Therapeutic Exercise: Stretching;Strengthening  Neuromuscular Reeducation: kinesio tape;posture;core;postural restoration  Manual Therapy: soft tissue mobilization;myofascial release;joint mobilization;muscle energy;strain counterstrain  Equipment: theraband  POC and pathology of condition were reviewed with patient.  Pt. is in agreement with the Plan of Care  A Home Exercise Program (HEP) was initiated today.  Pt. was instructed in exercises by PT and patient was given a handout with detailed instructions.    Plan for next visit: 2x/week for 2 weeks then reassess needs.  Next instruct in body mechanics, child's poses, REIL and begin MFR to back and hip flexors/iliopsoas.  Add prone and or all's core UE LE lifts.       Subjective:         Social information:      Occupation:      Work Status:  40 hours/week   Equipment Available: None    History of Present Illness:    Britney Groves is a 51 y.o. female who presents to therapy today with chief complaints of mid to lumbar paraspinal muscle spasm and pain, onset following a training run of 3-3.5 mile, later in the day, took Aleve and pain resolved.  Then ran a 5 mile race on 4/9, tolerating the run fine but had onset of more severe pain with this episode approximately 1.5 hours later.  She noted a hard bump to the right of her spinal column.  No prior episodes of similar pain but does report occasional ache in anterior left thigh after sitting for 2 hour car ride.  He goal is to be able to run another marathon on 5/7/2017.  Pt demo's signs and sx consistent with muscle strain.     Pain Rating current:  2-3  Pain  rating at best: 0  Pain rating at worst: 9 after race on 4/9/2017  Pain description: throb/ache variable intensity    Functional limitations are described as occurring with:   sleep interruptions 3-4x/night and finding comfortable position for sleep, decreased tolerance for normal ADL's and household chores, running, use of meds for pain management.    Patient reports benefit from:  use of stability ball and Aleve and muscle relaxer         Objective:      Note: Items left blank indicates the item was not performed or not indicated at the time of the evaluation.    Patient Outcome Measures :    Modified Oswestry Low Back Pain Disablity Questionnaire  in %: 26   Scores range from 0-100%, where a score of 0% represents minimal pain and maximal function. The minimal clinically important difference is a score reduction of 12%.    Examination  1. Back muscle spasm     2. Muscle weakness (generalized)     3. Tightness of heel cord, unspecified laterality     4. Hip flexor tightness, unspecified laterality         Involved side: Bilateral    Posture Observation: Standing:  C-protraction, left shoulder/scapula/iliac crest high, bilat pes planus    Palpation:  Mild tenderness right L3-4 paraspinals.  Bilat Lumbar paraspinal tightness    Repeated Motion Testing:  Not tested    Passive Mobility - Joint Integrity:  WNL    LE Screen:  Gastroc bilat min tightness left>right, Soleus min-mod tightness left>right, HS bilat min tightness, Hip ER right min loss, left min-mod loss, Hip IR right min loss, left mod loss, Hip Flex bilat nil loss,  Iliopsoas bilat min-nil tightness. No pain with tests    Iliopsoas test:  Checking thumb length with right shorter    Lumbar ROM:    Date: 4/14/2017     *Indicate scale AROM AROM AROM   Lumbar Flexion Min loss due to HS tightness, NE     Lumbar Extension Nil loss, NE      Right Left Right Left Right Left   Lumbar Sidebending Nil loss, NE Nil loss, NE       Lumbar Rotation Min loss, NE Min loss,  "NE       Thoracic Flexion Nil loss, NE     Thoracic Extension Nil loss, NE     Thoracic Sidebending         Thoracic Rotation           Lower Extremity Strength:   Deferred due to no c/o weakness.  Date:      LE strength/5 Right Left Right Left Right Left   Hip Flexion (L1-3)         Hip Extension (L5-S1)         Hip Abduction (L4-5)         Hip Adduction (L2-3)         Hip External Rotation         Hip Internal Rotation         Knee Extension (L3-4)         Knee Flexion         Ankle Dorsiflexion (L4-5)         Great Toe Extension (L5)         Ankle Plantar flexion (S1)         Abdominals Fair abdominal bracing       Sensation           Reflex Testing  Lumbar Dermatomes Right Left UE Reflexes Right Left   Iliac Crest and Groin (L1)   Biceps (C5-6)     Anterior Medial Thigh (L2)   Brachioradialis (C5-6)     Anterior Thigh, Medial Epicondyle Femur (L3)   Triceps (C7-8)     Lateral Thigh, Anterior Knee, Medial Leg/Malleolus (L4)   Daniel s test     Lateral Leg, Dorsal Foot (L5)   LE Reflexes     Lateral Foot (S1)   Patellar (L3-4)     Posterior Leg (S2)   Achilles (S1-2)     Other:   Babinski Response           Lumbar Special Tests:    Lumbar Special Tests Right Left SI Tests Right  Left   Quadrant test   SI Compression     Straight leg raise 80-85- 80- SI Distraction     Crossover response   POSH Test     Slump   Sacral Thrust     Sit-up test  FADIR     Trunk extensor endurance test  Resisted Abduction     Prone instability test  Other:     Pubic shotgun  Other:       Today's HEP:  Exercises:  Exercise #1: Gastrocsoleus stretch, 30\"x1 each-H  Comment #1: Standing/seated HS stretch, 30\"x1 each-H  Exercise #2: HS stretch with heel on doorway, trial, NE  Comment #2: Quad/hip flexor stretch at standing, demonstrated-H  Exercise #3: 1/2 kneel hip flexor stretch with glut set, 30\"x1 each-H  Comment #3: Adductor stretch at standing, 5\"x1 each-H  Exercise #4: Seated Hip adductor stretch, 5\"x1-H  Comment #4: Seated chair " "trunk rotation, 5\"x1 each-H  Exercise #5: SL trunk rotation stretch with OP at knees, 5\"x1 each-H  Comment #5: Posture/Body mechanics HO issued.  Instructed in proper sitting posture.  Discussed foot orthotics from [employer].    Tolerated exercises well.    Treatment Today    TREATMENT MINUTES COMMENTS   Evaluation 30 Lumbopelvic   Self-care/ Home management     Manual therapy     Neuromuscular Re-education     Therapeutic Activity     Therapeutic Exercises 25 See flow sheet   Gait training     Modality__________________                Total 55    Blank areas are intentional and mean the treatment did not include these items.     PT Evaluation Code: (Please list factors)  Patient History/Comorbidities: Acute onset 4/9/2017, no prior hx  Examination: Palpation, AROM, flexibility, posture  Clinical Presentation: Stable   Clinical Decision Making: low    Patient History/  Comorbidities Examination  (body structures and functions, activity limitations, and/or participation restrictions) Clinical Presentation Clinical Decision Making (Complexity)   No documented Comorbidities or personal factors 1-2 Elements Stable and/or uncomplicated Low   1-2 documented comorbidities or personal factor 3 Elements Evolving clinical presentation with changing characteristics Moderate   3-4 documented comorbidities or personal factors 4 or more Unstable and unpredictable High              Monique Andersen  4/14/2017  8:03 AM               "

## 2021-06-12 NOTE — PROGRESS NOTES
Assessment and Plan:     1. Encounter for preventive care  Up-to-date on ophthalmologic, dental and Derm care.  Mammogram due in December.  Colon cancer screening and bone density up-to-date.  Have discussed lifestyle.  She is taking vitamin D and getting dairy in the diet.  She is exercising regularly and has been on a weight watchers diet such that she is lost about 7 pounds.  She is feeling very well.  She is working from home and pleased with the situation.    - HM2(CBC w/o Differential)  - Comprehensive Metabolic Panel  - Vitamin D, Total (25-Hydroxy)    2. Hypercholesterolemia  We will update labs.  On atorvastatin at 10 mg.  She is hoping that she might be able to come off of some of her medication.  - Thyroid Cascade  - Lipid [place] FASTING    3. Need for hepatitis C screening test/screening HIV  Ordered for screening  - Hepatitis C Antibody (Anti-HCV)    4.  Low bone mass-with low FRAX  Recommendations: Recheck in 3 years.  Recommend weightbearing exercise along with dietary calcium-3-4 servings and vitamin D3.  Of note, her previous levels have been optimal.    Lula Landrum MD  10/2/2020    Chief Complaint:  Chief Complaint   Patient presents with     Annual Exam       History of Present Illness:  Britney is a 54 y.o. female who is here today for an annual physical examination.  Of note, she has no chief complaints today.  Overall, she is doing well.  She is working remotely during the pandemic.  She states that this has been a nice transition for her.  She is able to exercise vigilantly in the morning.  She has lost about 8 pounds following a weight watchers like diet.  She states she feels very well.    Health maintenance is reviewed and updated in the electronic health record.    She is received an influenza vaccination today.    Review of Systems:    The rest of the review of systems are negative for all systems.    PFSH:  Social History: She is  with one young adult daughter and 2 adult  "sons.  She is working remotely.  She is a non-smoker  Social History     Tobacco Use   Smoking Status Never Smoker   Smokeless Tobacco Never Used       Past Medical History: Hyperlipidemia and low bone mass  Allergies   Allergen Reactions     Simvastatin        Family History: Nothing new.  No family history of premature coronary disease  Family History   Problem Relation Age of Onset     Lung cancer Father      Hyperlipidemia Mother      Hypertension Mother      Anxiety disorder Mother      Graves' disease Sister      ADD / ADHD Daughter      Depression Daughter        Physical Exam:  Vitals:    10/02/20 0831   BP: 110/80   Patient Site: Right Arm   Patient Position: Sitting   Cuff Size: Adult Regular   Pulse: 98   Resp: 16   SpO2: 98%   Weight: 128 lb 8 oz (58.3 kg)   Height: 5' 1.75\" (1.568 m)     Wt Readings from Last 3 Encounters:   10/02/20 128 lb 8 oz (58.3 kg)   10/11/19 135 lb (61.2 kg)   10/09/18 137 lb 9.6 oz (62.4 kg)       General Appearance:  Alert, cooperative, no distress, appears stated age   Head:  Normocephalic, without obvious abnormality, atraumatic   Eyes:  PERRL, conjunctiva/corneas clear, EOM's intact   Ears:  Normal TM's and external ear canals, both ears   Nose: Nares normal, septum midline,mucosa normal, no drainage    Throat: Lips, mucosa, and tongue normal; teeth and gums normal   Neck: Supple, symmetrical, trachea midline, no adenopathy;  thyroid: not enlarged, symmetric, no tenderness/mass/nodules; no carotid bruit or JVD   Back:   Symmetric, no curvature, ROM normal, no CVA tenderness   Lungs:   Clear to auscultation bilaterally, respirations unlabored   Heart:  Regular rate and rhythm, S1 and S2 normal, no murmur, rub, or gallop   Abdomen:   Soft, non-tender, bowel sounds active all four quadrants,  no masses, no organomegaly, no hernias    Extremities: Extremities normal, atraumatic, no cyanosis or edema   Skin: Skin color, texture, turgor normal, no rashes or lesions   Lymph nodes: " Cervical, supraclavicular, and axillary nodes normal   Neurologic: Normal, CN 2-12 intact                              BREASTS:  Normal  Medications:  Current Outpatient Medications   Medication Sig Dispense Refill     aspirin 81 MG EC tablet Take 81 mg by mouth daily.       atorvastatin (LIPITOR) 10 MG tablet TAKE 1 TABLET BY MOUTH ONCE DAILY 90 tablet 3     calcium citrate-vitamin D (CITRACAL+D) 315-200 mg-unit per tablet Take 1 tablet by mouth 2 (two) times a day.       multivitamin capsule Take 1 capsule by mouth daily.       omega-3 fatty acids-fish oil (FISH OIL) 360-1,200 mg cap Take 1 capsule by mouth daily.       tretinoin (RETIN-A) 0.05 % cream APPLY A PEA SIZE AMOUNT TO AFFECTED AREA(S) BY TOPICAL ROUTE EVERY OTHER NIGHT WITH LOTION       No current facility-administered medications for this visit.        Immunizations:  Immunization History   Administered Date(s) Administered     DTP 04/01/1965, 08/05/1970, 11/30/1975, 07/30/1985     Hep A / Hep B 02/17/2014, 03/17/2014, 08/19/2014     INFLUENZA,RECOMBINANT,INJ,PF QUADRIVALENT 18+YRS 10/09/2018, 10/15/2019, 10/02/2020     Influenza, inj, historic,unspecified 11/01/2007, 10/18/2012, 09/11/2013     Influenza,seasonal,quad inj =/> 6months 10/14/2016, 10/17/2017     MMR 05/12/1966, 03/19/1990     OPV,Trivalent,Historic(9546-5603 only) 06/10/1965, 08/05/1970     Td, Adult, Absorbed 07/30/2003     Td, adult adsorbed, PF 10/09/2018     Td,adult,historic,unspecified 07/22/2008     Tdap 07/22/2008     Typhoid, Inj, Inactive 02/17/2014     ZOSTER, RECOMBINANT, IM 03/31/2019, 06/09/2019

## 2021-06-13 NOTE — PROGRESS NOTES
Assessment and Plan:     1. Preventative health care  She is up-to-date with regard to mammography, colon cancer screening.  Labs are updated today.  Dental and eye care are up-to-date.  Recommend skin survey.  Pap smear updated  Lifestyle reviewed.  She is exercising 4-5 times per week.  Diet is healthy  - Gynecologic Cytology (PAP Smear)  - Ambulatory referral to Dermatology    2. Hypercholesterolemia  She is on Lipitor at 10 mg.  Will update lab work  - Lipid Cascade FASTING  - HM2(CBC w/o Differential)  - Comprehensive Metabolic Panel    3. Medication monitoring encounter  As above  - Comprehensive Metabolic Panel      Follow-up one year      Lula Landrum MD  10/17/2017    Chief Complaint:  Chief Complaint   Patient presents with     Annual Exam       History of Present Illness:  Britney is a 51 y.o. female who is here today for her annual preventative care examination.  Of note, she has absolutely no chief complaints.  She does report a recent history of a vaginal yeast infection.  It presented with a whitish discharge.  It was easily treated with a Monistat.  She otherwise is doing well.    Lifestyle is reviewed.  She continues to exercise 3-5 days per week.  She and her  exercise in the early a.m. before heading out to work.  Their diet is healthy.  The eat protein and vegetables for dinner.  They are non-smokers.    Health maintenance is reviewed.  A flu shot is provided today.  Mammogram was done yesterday and was within normal limits.  Colonoscopy was done in 2015.  Pap smear will be updated today.  Ophthalmologic and dental care are up-to-date.  She has never had a skin survey    Review of Systems:   Last menstrual period was greater than 1 year ago.  Menopause documented at age 50.  The rest of the review of systems are negative for all systems.    PFSH:  Social History: She is .  She has 3 children.  Her youngest daughter Lalita is a college student.  Her 2 sons are adults and live  "outside of her home  History   Smoking Status     Never Smoker   Smokeless Tobacco     Not on file       Past Medical History: Hyperlipidemia  Allergies   Allergen Reactions     Simvastatin        Family History: Updated today  Family History   Problem Relation Age of Onset     Lung cancer Father      Hyperlipidemia Mother      Hypertension Mother      Anxiety disorder Mother      Graves' disease Sister      ADD / ADHD Daughter        Physical Exam:  Vitals:    10/17/17 0809   BP: 126/72   Patient Site: Left Arm   Patient Position: Sitting   Cuff Size: Adult Regular   Pulse: 60   Weight: 136 lb (61.7 kg)   Height: 5' 2\" (1.575 m)     Wt Readings from Last 3 Encounters:   10/17/17 136 lb (61.7 kg)   04/11/17 137 lb 9.6 oz (62.4 kg)   10/14/16 135 lb (61.2 kg)       General Appearance:  Alert, cooperative, no distress, appears stated age   Head:  Normocephalic, without obvious abnormality, atraumatic   Eyes:  PERRL, conjunctiva/corneas clear, EOM's intact   Ears:  Normal TM's and external ear canals, both ears   Nose: Nares normal, septum midline,mucosa normal, no drainage    Throat: Lips, mucosa, and tongue normal; teeth and gums normal   Neck: Supple, symmetrical, trachea midline, no adenopathy;  thyroid: not enlarged, symmetric, no tenderness/mass/nodules; no carotid bruit or JVD   Back:   Symmetric, no curvature, ROM normal, no CVA tenderness   Lungs:   Clear to auscultation bilaterally, respirations unlabored   Breasts:  No breast masses, tenderness, asymmetry, or nipple discharge.   Heart:  Regular rate and rhythm, S1 and S2 normal, no murmur, rub, or gallop   Abdomen:   Soft, non-tender, bowel sounds active all four quadrants,  no masses, no organomegaly   Genitalia: Normally developed genitalia with no external lesions or eruptions.  Vagina and cervix show no lesions, inflammation, discharge or tenderness.  No cystocele.  Uterus normal size and position.  No adnexal mass or tenderness.   Rectal:  No " hemorrhoids, tone normal   Extremities: Extremities normal, atraumatic, no cyanosis or edema   Skin: Skin color, texture, turgor normal, no rashes or lesions   Lymph nodes: Cervical, supraclavicular, and axillary nodes normal   Neurologic: Normal, CN 2-12 intact     Medications:  Current Outpatient Prescriptions   Medication Sig Dispense Refill     aspirin 81 MG EC tablet Take 81 mg by mouth daily.       atorvastatin (LIPITOR) 10 MG tablet TAKE 1 TABLET BY MOUTH EVERY DAY 90 tablet 3     calcium citrate-vitamin D (CITRACAL+D) 315-200 mg-unit per tablet Take 1 tablet by mouth 2 (two) times a day.       multivitamin capsule Take 1 capsule by mouth daily.       omega-3 fatty acids-fish oil (FISH OIL) 360-1,200 mg cap Take 1 capsule by mouth daily.       tretinoin, emollient, 0.02 % Crea Apply 1 application topically daily. 44 g 0     No current facility-administered medications for this visit.        Immunizations:  Immunization History   Administered Date(s) Administered     Influenza, inj, historic 11/01/2007, 10/18/2012, 09/11/2013     Influenza, seasonal,quad inj 36+ mos 10/14/2016, 10/17/2017     Td, historic 07/22/2008     Tdap 07/22/2008

## 2021-06-13 NOTE — TELEPHONE ENCOUNTER
Refill Approved    Rx renewed per Medication Renewal Policy. Medication was last renewed on 12/16/19.    Kassie Dolan, [facility] Triage/Med Refill 12/21/2020     Requested Prescriptions   Pending Prescriptions Disp Refills     atorvastatin (LIPITOR) 10 MG tablet [Pharmacy Med Name: Atorvastatin Calcium Oral Tablet 10 MG] 90 tablet 0     Sig: TAKE 1 TABLET BY MOUTH ONCE DAILY       Statins Refill Protocol (Hmg CoA Reductase Inhibitors) Passed - 12/18/2020  2:00 AM        Passed - PCP or prescribing provider visit in past 12 months      Last office visit with prescriber/PCP: 4/11/2017 Lula Landrum MD OR same dept: Visit date not found OR same specialty: 4/11/2017 Lula Landrum MD  Last physical: 10/2/2020 Last MTM visit: Visit date not found   Next visit within 3 mo: Visit date not found  Next physical within 3 mo: Visit date not found  Prescriber OR PCP: Lual Landrum MD  Last diagnosis associated with med order: 1. Hyperlipidemia  - atorvastatin (LIPITOR) 10 MG tablet [Pharmacy Med Name: Atorvastatin Calcium Oral Tablet 10 MG]; TAKE 1 TABLET BY MOUTH ONCE DAILY  Dispense: 90 tablet; Refill: 0    If protocol passes may refill for 12 months if within 3 months of last provider visit (or a total of 15 months).

## 2021-06-20 NOTE — PROGRESS NOTES
Assessment and Plan:     1. Preventative health care  Up-to-date on colon cancer screening and bone densitometry.  Up-to-date on ophthalmologic dental and Derm care.  Of note, recommend a 3D mammogram.  Flu and tetanus shots will be updated today.  Have discussed the importance of regular daily exercise and healthy diet  - HM2(CBC w/o Differential)  - Comprehensive Metabolic Panel  - Vitamin D, Total (25-Hydroxy)  - Lipid Cascade    2. Hypercholesterolemia  We will update lab  - Lipid Cascade      3.  Insomnia  Recommendations: Have discussed the importance of sleep hygiene, i.e.: No technology 2 hours before bedtime, call me nighttime routine, reduce caffeine after 12 noon, melatonin up to 5-6 mg 1 hour before bedtime    Lula Landrum MD  10/9/2018    Chief Complaint:  Chief Complaint   Patient presents with     Annual Exam     Pt will have mammo done in december        History of Present Illness:  Britney is a 52 y.o. female who is here today for preventative health examination.  Of note, in general she enjoys good health.  She is really doing well with no major complaints.    Health maintenance is reviewed and updated in the chart    Review of Systems:   He has some issues with insomnia.  She denies hot flashes, vaginal or pelvic bleeding.  The rest of the review of systems are negative for all systems.    PFSH:  Social History: She is  with adult children.  Her son is moving to [place].  She exercises regularly  History   Smoking Status     Never Smoker   Smokeless Tobacco     Never Used       Past Medical History:   Past Medical History:   Diagnosis Date     Breast cyst 2010     Chest pain        Allergies   Allergen Reactions     Simvastatin        Family History: Updated  Family History   Problem Relation Age of Onset     Lung cancer Father      Hyperlipidemia Mother      Hypertension Mother      Anxiety disorder Mother      Graves' disease Sister      ADD / ADHD Daughter      Depression Daughter   "      Physical Exam:  Vitals:    10/09/18 0855   BP: 98/64   Patient Site: Left Arm   Patient Position: Sitting   Cuff Size: Adult Regular   Pulse: 78   Weight: 137 lb 9.6 oz (62.4 kg)   Height: 5' 2\" (1.575 m)     Wt Readings from Last 3 Encounters:   10/09/18 137 lb 9.6 oz (62.4 kg)   10/17/17 136 lb (61.7 kg)   04/11/17 137 lb 9.6 oz (62.4 kg)       General Appearance:  Alert, cooperative, no distress, appears stated age   Head:  Normocephalic, without obvious abnormality, atraumatic   Eyes:  PERRL, conjunctiva/corneas clear, EOM's intact   Ears:  Normal TM's and external ear canals, both ears   Nose: Nares normal, septum midline,mucosa normal, no drainage    Throat: Lips, mucosa, and tongue normal; teeth and gums normal   Neck: Supple, symmetrical, trachea midline, no adenopathy;  thyroid: not enlarged, symmetric, no tenderness/mass/nodules; no carotid bruit or JVD   Back:   Symmetric, no curvature, ROM normal, no CVA tenderness   Lungs:   Clear to auscultation bilaterally, respirations unlabored   Heart:  Regular rate and rhythm, S1 and S2 normal, no murmur, rub, or gallop   Abdomen:   Soft, non-tender, bowel sounds active all four quadrants,  no masses, no organomegaly, no hernias    Extremities: Extremities normal, atraumatic, no cyanosis or edema   Skin: Skin color, texture, turgor normal, no rashes or lesions   Lymph nodes: Cervical, supraclavicular, and axillary nodes normal   Neurologic: Normal, CN 2-12 intact                                 Breasts: Dense bilaterally with no masses, nipple discharge or axillary adenopathy    Medications:  Current Outpatient Prescriptions   Medication Sig Dispense Refill     aspirin 81 MG EC tablet Take 81 mg by mouth daily.       atorvastatin (LIPITOR) 10 MG tablet TAKE 1 TABLET BY MOUTH ONCE DAILY  90 tablet 0     calcium citrate-vitamin D (CITRACAL+D) 315-200 mg-unit per tablet Take 1 tablet by mouth 2 (two) times a day.       multivitamin capsule Take 1 capsule by " mouth daily.       omega-3 fatty acids-fish oil (FISH OIL) 360-1,200 mg cap Take 1 capsule by mouth daily.       tretinoin (RETIN-A) 0.025 % cream Apply topically daily. 20 g 3     tretinoin, emollient, 0.02 % Crea Apply 1 application topically daily. 44 g 0     No current facility-administered medications for this visit.        Immunizations:  Immunization History   Administered Date(s) Administered     DTP 04/01/1965, 08/05/1970, 11/30/1975, 07/30/1985     Hep A / Hep B 02/17/2014, 03/17/2014, 08/19/2014     Influenza, inj, historic,unspecified 11/01/2007, 10/18/2012, 09/11/2013     Influenza, seasonal,quad inj 36+ mos 10/14/2016, 10/17/2017     MMR 05/12/1966, 03/19/1990     OPV,Trivalent,Historic(2697-0611 only) 06/10/1965, 08/05/1970     Td, Adult, Absorbed 07/30/2003     Td,adult,historic,unspecified 07/22/2008     Tdap 07/22/2008     Typhoid, Inj, Inactive 02/17/2014

## 2021-06-27 ENCOUNTER — HEALTH MAINTENANCE LETTER (OUTPATIENT)
Age: 56
End: 2021-06-27

## 2021-07-07 ENCOUNTER — RECORDS - HEALTHEAST (OUTPATIENT)
Dept: ADMINISTRATIVE | Facility: OTHER | Age: 56
End: 2021-07-07

## 2021-07-13 ENCOUNTER — RECORDS - HEALTHEAST (OUTPATIENT)
Dept: ADMINISTRATIVE | Facility: CLINIC | Age: 56
End: 2021-07-13

## 2021-07-21 ENCOUNTER — RECORDS - HEALTHEAST (OUTPATIENT)
Dept: ADMINISTRATIVE | Facility: CLINIC | Age: 56
End: 2021-07-21

## 2021-07-22 ENCOUNTER — RECORDS - HEALTHEAST (OUTPATIENT)
Dept: INTERNAL MEDICINE | Facility: CLINIC | Age: 56
End: 2021-07-22

## 2021-07-22 DIAGNOSIS — Z12.31 OTHER SCREENING MAMMOGRAM: ICD-10-CM

## 2021-08-26 ENCOUNTER — TRANSFERRED RECORDS (OUTPATIENT)
Dept: HEALTH INFORMATION MANAGEMENT | Facility: CLINIC | Age: 56
End: 2021-08-26

## 2021-10-01 ENCOUNTER — OFFICE VISIT (OUTPATIENT)
Dept: INTERNAL MEDICINE | Facility: CLINIC | Age: 56
End: 2021-10-01
Payer: COMMERCIAL

## 2021-10-01 VITALS
SYSTOLIC BLOOD PRESSURE: 120 MMHG | WEIGHT: 139.5 LBS | OXYGEN SATURATION: 98 % | HEIGHT: 62 IN | DIASTOLIC BLOOD PRESSURE: 78 MMHG | HEART RATE: 72 BPM | BODY MASS INDEX: 25.67 KG/M2

## 2021-10-01 DIAGNOSIS — E55.9 VITAMIN D DEFICIENCY: ICD-10-CM

## 2021-10-01 DIAGNOSIS — E78.00 HYPERCHOLESTEROLEMIA: ICD-10-CM

## 2021-10-01 DIAGNOSIS — Z01.818 PRE-OP EXAM: Primary | ICD-10-CM

## 2021-10-01 DIAGNOSIS — Z00.00 ENCOUNTER FOR PREVENTIVE CARE: ICD-10-CM

## 2021-10-01 LAB
ALBUMIN SERPL-MCNC: 4.2 G/DL (ref 3.5–5)
ALP SERPL-CCNC: 53 U/L (ref 45–120)
ALT SERPL W P-5'-P-CCNC: 17 U/L (ref 0–45)
ANION GAP SERPL CALCULATED.3IONS-SCNC: 13 MMOL/L (ref 5–18)
AST SERPL W P-5'-P-CCNC: 20 U/L (ref 0–40)
BILIRUB SERPL-MCNC: 0.8 MG/DL (ref 0–1)
BUN SERPL-MCNC: 12 MG/DL (ref 8–22)
CALCIUM SERPL-MCNC: 9.6 MG/DL (ref 8.5–10.5)
CHLORIDE BLD-SCNC: 105 MMOL/L (ref 98–107)
CHOLEST SERPL-MCNC: 208 MG/DL
CO2 SERPL-SCNC: 24 MMOL/L (ref 22–31)
CREAT SERPL-MCNC: 0.73 MG/DL (ref 0.6–1.1)
ERYTHROCYTE [DISTWIDTH] IN BLOOD BY AUTOMATED COUNT: 12.3 % (ref 10–15)
FASTING STATUS PATIENT QL REPORTED: YES
GFR SERPL CREATININE-BSD FRML MDRD: >90 ML/MIN/1.73M2
GLUCOSE BLD-MCNC: 92 MG/DL (ref 70–125)
HCT VFR BLD AUTO: 41.7 % (ref 35–47)
HDLC SERPL-MCNC: 86 MG/DL
HGB BLD-MCNC: 13.4 G/DL (ref 11.7–15.7)
LDLC SERPL CALC-MCNC: 105 MG/DL
MCH RBC QN AUTO: 30.4 PG (ref 26.5–33)
MCHC RBC AUTO-ENTMCNC: 32.1 G/DL (ref 31.5–36.5)
MCV RBC AUTO: 95 FL (ref 78–100)
PLATELET # BLD AUTO: 229 10E3/UL (ref 150–450)
POTASSIUM BLD-SCNC: 4.3 MMOL/L (ref 3.5–5)
PROT SERPL-MCNC: 6.8 G/DL (ref 6–8)
RBC # BLD AUTO: 4.41 10E6/UL (ref 3.8–5.2)
SODIUM SERPL-SCNC: 142 MMOL/L (ref 136–145)
TRIGL SERPL-MCNC: 86 MG/DL
TSH SERPL DL<=0.005 MIU/L-ACNC: 1.2 UIU/ML (ref 0.3–5)
WBC # BLD AUTO: 5.5 10E3/UL (ref 4–11)

## 2021-10-01 PROCEDURE — 99215 OFFICE O/P EST HI 40 MIN: CPT | Mod: 25 | Performed by: INTERNAL MEDICINE

## 2021-10-01 PROCEDURE — 90682 RIV4 VACC RECOMBINANT DNA IM: CPT | Performed by: INTERNAL MEDICINE

## 2021-10-01 PROCEDURE — 93010 ELECTROCARDIOGRAM REPORT: CPT | Performed by: INTERNAL MEDICINE

## 2021-10-01 PROCEDURE — 82306 VITAMIN D 25 HYDROXY: CPT | Performed by: INTERNAL MEDICINE

## 2021-10-01 PROCEDURE — 80061 LIPID PANEL: CPT | Performed by: INTERNAL MEDICINE

## 2021-10-01 PROCEDURE — 90471 IMMUNIZATION ADMIN: CPT | Performed by: INTERNAL MEDICINE

## 2021-10-01 PROCEDURE — G0123 SCREEN CERV/VAG THIN LAYER: HCPCS | Performed by: INTERNAL MEDICINE

## 2021-10-01 PROCEDURE — 87624 HPV HI-RISK TYP POOLED RSLT: CPT | Performed by: INTERNAL MEDICINE

## 2021-10-01 PROCEDURE — 80053 COMPREHEN METABOLIC PANEL: CPT | Performed by: INTERNAL MEDICINE

## 2021-10-01 PROCEDURE — 85027 COMPLETE CBC AUTOMATED: CPT | Performed by: INTERNAL MEDICINE

## 2021-10-01 PROCEDURE — 93005 ELECTROCARDIOGRAM TRACING: CPT | Performed by: INTERNAL MEDICINE

## 2021-10-01 PROCEDURE — 36415 COLL VENOUS BLD VENIPUNCTURE: CPT | Performed by: INTERNAL MEDICINE

## 2021-10-01 PROCEDURE — 84443 ASSAY THYROID STIM HORMONE: CPT | Performed by: INTERNAL MEDICINE

## 2021-10-01 ASSESSMENT — MIFFLIN-ST. JEOR: SCORE: 1172.05

## 2021-10-01 NOTE — PROGRESS NOTES
[facility]  [address] [place] [address]  Phone: [phone]  Fax: [phone]  Primary Provider: Lula Landrum  :[id number]}  PREOPERATIVE EVALUATION:  Today's date: 10/1/2021    Britney Groves is a 55 year old female who presents for a preoperative evaluation.    Surgical Information:  Surgery/Procedure: (L) Rotator Cuff Repair  Surgery Location: [facility]  Surgeon: Jim Aguiar  Surgery Date: 10/18/21  Where patient plans to recover: At home with family  Fax number for surgical facility: [phone]    Type of Anesthesia Anticipated: to be determined    Assessment/Plan:     DX: 1. Pre-op exam  Britney is medically stable for anesthesia prior to left shoulder arthroscopy surgery.  Of note, she is anesthesia naïve-not having general anesthesia in the past.  There is no family history of untoward reactions to local or general anesthetic agents.  There is no family history of bleeding disorder or procoagulant disorder.  She is otherwise very healthy and physically active.    Preoperative medications are reviewed.  She will avoid fish oil and aspirin for 10 days prior to her surgery.  7 days prior to her surgery, she will avoid ibuprofen and naproxen.  She will be n.p.o. on the day of surgery.    Aftercare will be provided by her .      - EKG 12-lead, tracing only-personally reviewed.  Normal sinus rhythm with no acute abnormalities.  - Comprehensive metabolic panel; Future  - CBC with platelets; Future  - Comprehensive metabolic panel  - CBC with platelets    2. Hypercholesterolemia  We will update labs  - Lipid panel reflex to direct LDL Fasting; Future  - TSH with free T4 reflex; Future  - Lipid panel reflex to direct LDL Fasting  - TSH with free T4 reflex    3. Vitamin D deficiency  We will update labs  - Vitamin D Deficiency; Future  - Vitamin D Deficiency    4. Encounter for preventive care  Note, this visit encompasses preventative  care as well.  Flu shot is provided and a Pap smear is updated.  - Gynecologic Cytology (PAP Smear); Future       Follow-up annually and as needed         Subjective     HPI related to upcoming procedure:   Britney is a delightful 55-year-old healthy female who is here today for a preoperative visit prior to having shoulder surgery.  Of note, she has had very few occurrences of exposure to anesthesia.  She had a wisdom tooth extraction and has had a colonoscopy in the past without any untoward reaction.    Today, he feels well-with the exception of shoulder discomfort.  She denies any chest pain, shortness of breath or any new bowel or bladder issues.  She denies any exposure to COVID-19.    Pertinent anesthesia history: There is no personal or family history of untoward reaction to local or general anesthetic agents.  There is no history of bleeding or procoagulant disorder.  She does not have primary coronary artery disease, type 2 diabetes, chronic lung or kidney disease.  She is able to achieve 4 METS of activity.  She is a non-smoker.      Preop Questions 9/28/2021   1. Have you ever had a heart attack or stroke? No   2. Have you ever had surgery on your heart or blood vessels, such as a stent placement, a coronary artery bypass, or surgery on an artery in your head, neck, heart, or legs? No   3. Do you have chest pain with activity? No   4. Do you have a history of  heart failure? No   5. Do you currently have a cold, bronchitis or symptoms of other infection? No   6. Do you have a cough, shortness of breath, or wheezing? No   7. Do you or anyone in your family have previous history of blood clots? No   8. Do you or does anyone in your family have a serious bleeding problem such as prolonged bleeding following surgeries or cuts? No   9. Have you ever had problems with anemia or been told to take iron pills? No   10. Have you had any abnormal blood loss such as black, tarry or bloody stools, or abnormal vaginal  bleeding? No   11. Have you ever had a blood transfusion? No   12. Are you willing to have a blood transfusion if it is medically needed before, during, or after your surgery? Yes   13. Have you or any of your relatives ever had problems with anesthesia? No   14. Do you have sleep apnea, excessive snoring or daytime drowsiness? No   15. Do you have any artifical heart valves or other implanted medical devices like a pacemaker, defibrillator, or continuous glucose monitor? No   16. Do you have artificial joints? No   17. Are you allergic to latex? No   18. Is there any chance that you may be pregnant? No       Health Care Directive:  Patient does not have a Health Care Directive or Living Will:     Preoperative Review of :   reviewed - no record of controlled substances prescribed.    Patient Active Problem List    Diagnosis Date Noted     Hypercholesterolemia      Priority: Medium     Created by Conversion         Sinus Tachycardia      Priority: Medium     Created by Conversion          Past Medical History:   Diagnosis Date     Breast cyst 2010     Chest pain      No past surgical history on file.  Current Outpatient Medications   Medication Sig Dispense Refill     aspirin 81 MG EC tablet [ASPIRIN 81 MG EC TABLET] Take 81 mg by mouth daily.       atorvastatin (LIPITOR) 10 MG tablet [ATORVASTATIN (LIPITOR) 10 MG TABLET] TAKE 1 TABLET BY MOUTH ONCE DAILY 90 tablet 3     calcium citrate-vitamin D (CITRACAL+D) 315-200 mg-unit per tablet [CALCIUM CITRATE-VITAMIN D (CITRACAL+D) 315-200 MG-UNIT PER TABLET] Take 1 tablet by mouth 2 (two) times a day.       multivitamin capsule [MULTIVITAMIN CAPSULE] Take 1 capsule by mouth daily.       omega-3 fatty acids-fish oil (FISH OIL) 360-1,200 mg cap [OMEGA-3 FATTY ACIDS-FISH OIL (FISH OIL) 360-1,200 MG CAP] Take 1 capsule by mouth daily.       tretinoin (RETIN-A) 0.05 % cream [TRETINOIN (RETIN-A) 0.05 % CREAM] APPLY A PEA SIZE AMOUNT TO AFFECTED AREA(S) BY TOPICAL ROUTE  "EVERY OTHER NIGHT WITH LOTION         Allergies   Allergen Reactions     Simvastatin Unknown        Social History     Tobacco Use     Smoking status: Never Smoker     Smokeless tobacco: Never Used   Substance Use Topics     Alcohol use: Not on file     History   Drug Use Not on file         Objective     /78 (BP Location: Left arm, Patient Position: Sitting, Cuff Size: Adult Regular)   Pulse 72   Ht 1.568 m (5' 1.75\")   Wt 63.3 kg (139 lb 8 oz)   SpO2 98%   BMI 25.72 kg/m      Physical Exam  EYES: Eyelids, conjunctiva, and sclera were normal. Pupils were normal. Cornea, iris, and lens were normal bilaterally.  HEAD, EARS, NOSE, MOUTH, AND THROAT: Head and face were normal. Hearing was normal to voice and the ears were normal to external exam. Nose appearance was normal and there was no discharge. Oropharynx was normal.  TMs were normal.  NECK: Neck appearance was normal. There were no neck masses and the thyroid was not enlarged.  RESPIRATORY: Breathing pattern was normal and the chest moved symmetrically.   Lung sounds were equal bilaterally.  CARDIOVASCULAR: Heart rate and rhythm were normal.  S1 and S2 were normal and there were no extra sounds or murmurs. Peripheral pulses in arms and legs were normal.  Jugular venous pressure was normal.  There was no peripheral edema.  GASTROINTESTINAL: The abdomen was normal in contour.  Bowel sounds were present.   Palpation detected no tenderness, mass, or enlarged organs.   MUSCULOSKELETAL: Skeletal configuration was normal and muscle mass was normal for age. Joint appearance was overall normal.  LYMPHATIC: There were no enlarged nodes.  SKIN/HAIR/NAILS: Skin color was normal.  There were no abnormal skin lesions.  Hair and nails were normal.  NEUROLOGIC: The patient was alert and oriented to person, place, time, and circumstance. Speech was normal. Cranial nerves were normal. Motor strength was normal for age. The patient was normally " coordinated.  PSYCHIATRIC:  Mood and affect were normal and the patient had normal recent and remote memory. The patient's judgment and insight were normal.  Breast and pelvic exam are performed also and are within normal limits.  A Pap smear is taken.        Recent Labs   Lab Test 10/02/20  0908 10/02/20  0908 10/11/19  0825 10/11/19  0825   HGB 14.4  --  14.8  --      --  251  --    NA  --  142  --  142   POTASSIUM  --  4.8  --  4.3   CR  --  0.86  --  0.88        Diagnostics:  Labs pending at this time.  Results will be reviewed when available.   EKG: appears normal, NSR, Normal Sinus Rhythm, normal axis, normal intervals, no acute ST/T changes c/w ischemia, no LVH by voltage criteria, unchanged from previous tracings    Revised Cardiac Risk Index (RCRI):  The patient has the following serious cardiovascular risks for perioperative complications:   - No serious cardiac risks = 0 points     RCRI Interpretation: 0 points: Class I (very low risk - 0.4% complication rate)          The visit lasted a total of 40 minutes          Signed Electronically by: Lula Landrum MD

## 2021-10-02 LAB
ATRIAL RATE - MUSE: 67 BPM
DIASTOLIC BLOOD PRESSURE - MUSE: NORMAL MMHG
INTERPRETATION ECG - MUSE: NORMAL
P AXIS - MUSE: 71 DEGREES
PR INTERVAL - MUSE: 180 MS
QRS DURATION - MUSE: 76 MS
QT - MUSE: 410 MS
QTC - MUSE: 433 MS
R AXIS - MUSE: 30 DEGREES
SYSTOLIC BLOOD PRESSURE - MUSE: NORMAL MMHG
T AXIS - MUSE: 55 DEGREES
VENTRICULAR RATE- MUSE: 67 BPM

## 2021-10-04 LAB — DEPRECATED CALCIDIOL+CALCIFEROL SERPL-MC: 45 UG/L (ref 30–80)

## 2021-10-09 LAB
HUMAN PAPILLOMA VIRUS 16 DNA: NEGATIVE
HUMAN PAPILLOMA VIRUS 18 DNA: NEGATIVE
HUMAN PAPILLOMA VIRUS FINAL DIAGNOSIS: NORMAL
HUMAN PAPILLOMA VIRUS OTHER HR: NEGATIVE

## 2021-10-11 LAB
BKR LAB AP GYN ADEQUACY: NORMAL
BKR LAB AP GYN INTERPRETATION: NORMAL
BKR LAB AP HPV REFLEX: NORMAL
BKR LAB AP PREVIOUS ABNORMAL: NORMAL
PATH REPORT.COMMENTS IMP SPEC: NORMAL
PATH REPORT.RELEVANT HX SPEC: NORMAL

## 2021-10-18 ENCOUNTER — TRANSFERRED RECORDS (OUTPATIENT)
Dept: HEALTH INFORMATION MANAGEMENT | Facility: CLINIC | Age: 56
End: 2021-10-18

## 2021-10-27 ENCOUNTER — TRANSFERRED RECORDS (OUTPATIENT)
Dept: HEALTH INFORMATION MANAGEMENT | Facility: CLINIC | Age: 56
End: 2021-10-27
Payer: COMMERCIAL

## 2021-11-24 ENCOUNTER — TRANSFERRED RECORDS (OUTPATIENT)
Dept: HEALTH INFORMATION MANAGEMENT | Facility: CLINIC | Age: 56
End: 2021-11-24
Payer: COMMERCIAL

## 2021-12-12 ENCOUNTER — HEALTH MAINTENANCE LETTER (OUTPATIENT)
Age: 56
End: 2021-12-12

## 2021-12-16 ENCOUNTER — ANCILLARY PROCEDURE (OUTPATIENT)
Dept: MAMMOGRAPHY | Facility: CLINIC | Age: 56
End: 2021-12-16
Attending: INTERNAL MEDICINE
Payer: COMMERCIAL

## 2021-12-16 DIAGNOSIS — Z12.31 VISIT FOR SCREENING MAMMOGRAM: ICD-10-CM

## 2021-12-16 PROCEDURE — 77067 SCR MAMMO BI INCL CAD: CPT

## 2022-01-07 ENCOUNTER — TRANSFERRED RECORDS (OUTPATIENT)
Dept: HEALTH INFORMATION MANAGEMENT | Facility: CLINIC | Age: 57
End: 2022-01-07
Payer: COMMERCIAL

## 2022-01-30 DIAGNOSIS — E78.5 HYPERLIPIDEMIA: ICD-10-CM

## 2022-02-01 RX ORDER — ATORVASTATIN CALCIUM 10 MG/1
10 TABLET, FILM COATED ORAL DAILY
Qty: 90 TABLET | Refills: 2 | Status: SHIPPED | OUTPATIENT
Start: 2022-02-01 | End: 2022-09-28

## 2022-02-01 NOTE — TELEPHONE ENCOUNTER
"Last Written Prescription Date:  12/21/20  Last Fill Quantity: 90,  # refills: 3   Last office visit provider:  10/1/21     Requested Prescriptions   Pending Prescriptions Disp Refills     atorvastatin (LIPITOR) 10 MG tablet [Pharmacy Med Name: Atorvastatin Calcium Oral Tablet 10 MG] 90 tablet 0     Sig: TAKE 1 TABLET BY MOUTH ONCE DAILY       [facility] Protocol Passed - 1/30/2022  9:21 AM        Passed - LDL on file in past 12 months     Recent Labs   Lab Test 10/01/21  1425                Passed - No abnormal creatine kinase in past 12 months     No lab results found.             Passed - Recent (12 mo) or future (30 days) visit within the authorizing provider's specialty     Patient has had an office visit with the authorizing provider or a provider within the authorizing providers department within the previous 12 mos or has a future within next 30 days. See \"Patient Info\" tab in inbasket, or \"Choose Columns\" in Meds & Orders section of the refill encounter.              Passed - Medication is active on med list        Passed - Patient is age 18 or older        Passed - No active pregnancy on record        Passed - No positive pregnancy test in past 12 months             Sorin Garcias RN 02/01/22 8:59 AM  "

## 2022-03-04 ENCOUNTER — TRANSFERRED RECORDS (OUTPATIENT)
Dept: HEALTH INFORMATION MANAGEMENT | Facility: CLINIC | Age: 57
End: 2022-03-04
Payer: COMMERCIAL

## 2022-03-18 ENCOUNTER — OFFICE VISIT (OUTPATIENT)
Dept: INTERNAL MEDICINE | Facility: CLINIC | Age: 57
End: 2022-03-18
Payer: COMMERCIAL

## 2022-03-18 VITALS
OXYGEN SATURATION: 99 % | WEIGHT: 133.3 LBS | SYSTOLIC BLOOD PRESSURE: 112 MMHG | DIASTOLIC BLOOD PRESSURE: 78 MMHG | BODY MASS INDEX: 23.62 KG/M2 | HEART RATE: 78 BPM | HEIGHT: 63 IN

## 2022-03-18 DIAGNOSIS — Z01.818 PRE-OP EXAM: Primary | ICD-10-CM

## 2022-03-18 LAB
ANION GAP SERPL CALCULATED.3IONS-SCNC: 10 MMOL/L (ref 5–18)
BUN SERPL-MCNC: 11 MG/DL (ref 8–22)
CALCIUM SERPL-MCNC: 9.9 MG/DL (ref 8.5–10.5)
CHLORIDE BLD-SCNC: 105 MMOL/L (ref 98–107)
CO2 SERPL-SCNC: 27 MMOL/L (ref 22–31)
CREAT SERPL-MCNC: 0.83 MG/DL (ref 0.6–1.1)
ERYTHROCYTE [DISTWIDTH] IN BLOOD BY AUTOMATED COUNT: 12 % (ref 10–15)
GFR SERPL CREATININE-BSD FRML MDRD: 82 ML/MIN/1.73M2
GLUCOSE BLD-MCNC: 103 MG/DL (ref 70–125)
HCT VFR BLD AUTO: 42.5 % (ref 35–47)
HGB BLD-MCNC: 13.8 G/DL (ref 11.7–15.7)
MCH RBC QN AUTO: 29.7 PG (ref 26.5–33)
MCHC RBC AUTO-ENTMCNC: 32.5 G/DL (ref 31.5–36.5)
MCV RBC AUTO: 92 FL (ref 78–100)
PLATELET # BLD AUTO: 264 10E3/UL (ref 150–450)
POTASSIUM BLD-SCNC: 4.5 MMOL/L (ref 3.5–5)
RBC # BLD AUTO: 4.64 10E6/UL (ref 3.8–5.2)
SODIUM SERPL-SCNC: 142 MMOL/L (ref 136–145)
WBC # BLD AUTO: 5 10E3/UL (ref 4–11)

## 2022-03-18 PROCEDURE — 85027 COMPLETE CBC AUTOMATED: CPT | Performed by: INTERNAL MEDICINE

## 2022-03-18 PROCEDURE — 80048 BASIC METABOLIC PNL TOTAL CA: CPT | Performed by: INTERNAL MEDICINE

## 2022-03-18 PROCEDURE — 36415 COLL VENOUS BLD VENIPUNCTURE: CPT | Performed by: INTERNAL MEDICINE

## 2022-03-18 PROCEDURE — 99214 OFFICE O/P EST MOD 30 MIN: CPT | Performed by: INTERNAL MEDICINE

## 2022-03-18 NOTE — PROGRESS NOTES
[facility]  [address] [place] [address]  Phone: [phone]  Fax: [phone]  Primary Provider: Lula Landrum  :[id number]}  PREOPERATIVE EVALUATION:  Today's date: 3/18/2022    Britney Groves is a 56 year old female who presents for a preoperative evaluation.    Surgical Information:  Surgery/Procedure: Bilateral Breast Reduction/ indications MAcromastia  Surgery Location: [facility]  Surgeon: Dr. Barton  Surgery Date: 4/5/22  Where patient plans to recover: At home with family  Fax number for surgical facility: [phone]    Type of Anesthesia Anticipated: General    Assessment/Plan:     ASSESSMENT and PLAN:  1. Pre-op exam  Britney is medically stable for surgery for breast reduction for a diagnosis of macromastia with associated musculoskeletal issues.  She has no history of untoward reaction to local or general anesthetic agents.  She has no history of primary coronary artery disease, type 2 diabetes, chronic lung or kidney disease.  She is a non-smoker and is able to achieve greater than 4 METS of activity.      She has instructions with regard to aspirin, NSAIDs and fish oil.  She will be holding these 2 weeks before surgery.      She is on no prescription medications and therefore is able to be n.p.o. on the a.m. of surgery.    EKG in December 2021 is unremarkable with normal sinus rhythm and no abnormalities.  General chemistries will be updated today.  - Basic metabolic panel; Future  - CBC with platelets; Future       Preventive Care Assessed:          Medication list carefully reviewed and corrected  Epic Merger Problem list addressed and updated         Subjective     HPI related to upcoming procedure: Britney is a pleasant 56-year-old female who is here today for preoperative evaluation prior to having bilateral breast reduction .  In general, she enjoys excellent health.  She has no history of untoward reaction to local or  general anesthetic agents.  Her medical history is uneventful.    Currently, she denies any symptoms of chest pain, shortness of breath or any new bowel or bladder issues.  She is a non-smoker.  She is fully vaccinated against COVID-19.  Her third shot was November .    Preop Questions 3/15/2022   1. Have you ever had a heart attack or stroke? No   2. Have you ever had surgery on your heart or blood vessels, such as a stent placement, a coronary artery bypass, or surgery on an artery in your head, neck, heart, or legs? No   3. Do you have chest pain with activity? No   4. Do you have a history of  heart failure? No   5. Do you currently have a cold, bronchitis or symptoms of other infection? No   6. Do you have a cough, shortness of breath, or wheezing? No   7. Do you or anyone in your family have previous history of blood clots? No   8. Do you or does anyone in your family have a serious bleeding problem such as prolonged bleeding following surgeries or cuts? No   9. Have you ever had problems with anemia or been told to take iron pills? No   10. Have you had any abnormal blood loss such as black, tarry or bloody stools, or abnormal vaginal bleeding? No   11. Have you ever had a blood transfusion? No   12. Are you willing to have a blood transfusion if it is medically needed before, during, or after your surgery? Yes   13. Have you or any of your relatives ever had problems with anesthesia? No   14. Do you have sleep apnea, excessive snoring or daytime drowsiness? No   15. Do you have any artifical heart valves or other implanted medical devices like a pacemaker, defibrillator, or continuous glucose monitor? No   16. Do you have artificial joints? No   17. Are you allergic to latex? No   18. Is there any chance that you may be pregnant? No       Health Care Directive:  Patient does not have a Health Care Directive or Living Will:     Preoperative Review of :   reviewed - no record of controlled  substances prescribed.     Immunization History   Administered Date(s) Administered     COVID-19,PF,Moderna 02/23/2021, 03/23/2021, 11/27/2021     FLU 6-35 months 09/26/2009     Flu, Unspecified 11/01/2007, 09/26/2009, 10/18/2012, 09/11/2013, 09/15/2015     Historical DTP/aP 04/01/1965, 08/05/1970, 11/30/1975, 07/30/1985     Influenza (H1N1) 01/10/2010     Influenza (IIV3) PF 11/01/2007, 10/18/2012, 09/11/2013     Influenza Quad, Recombinant, pf(RIV4) (Flublok) 10/09/2018, 10/15/2019, 10/02/2020, 10/01/2021     Influenza Vaccine IM > 6 months Valent IIV4 (Alfuria,Fluzone) 09/16/2014     Influenza Vaccine, 6+MO IM (QUADRIVALENT W/PRESERVATIVES) 10/14/2016, 10/17/2017     MMR 05/12/1966, 03/19/1990     OPV, trivalent, live 06/10/1965, 08/05/1970     TD (ADULT, 7+) 10/09/2018     Td (Adult), Adsorbed 07/30/2003     Td,adult,historic,unspecified 07/22/2008     Tdap (Adacel,Boostrix) 07/22/2008     Twinrix A/B 02/17/2014, 03/17/2014, 08/19/2014     Typhoid IM 02/17/2014     Zoster vaccine recombinant adjuvanted (SHINGRIX) 03/31/2019, 06/09/2019         Patient Active Problem List    Diagnosis Date Noted     Hypercholesterolemia      Priority: Medium     Created by Conversion         Sinus Tachycardia      Priority: Medium     Created by Conversion          Past Medical History:   Diagnosis Date     Breast cyst 2010     Chest pain      No past surgical history on file.  Current Outpatient Medications   Medication Sig Dispense Refill     aspirin 81 MG EC tablet [ASPIRIN 81 MG EC TABLET] Take 81 mg by mouth daily.       atorvastatin (LIPITOR) 10 MG tablet Take 1 tablet (10 mg) by mouth daily 90 tablet 2     calcium citrate-vitamin D (CITRACAL+D) 315-200 mg-unit per tablet [CALCIUM CITRATE-VITAMIN D (CITRACAL+D) 315-200 MG-UNIT PER TABLET] Take 1 tablet by mouth 2 (two) times a day.       multivitamin capsule [MULTIVITAMIN CAPSULE] Take 1 capsule by mouth daily.       omega-3 fatty acids-fish oil (FISH OIL) 360-1,200 mg cap  "[OMEGA-3 FATTY ACIDS-FISH OIL (FISH OIL) 360-1,200 MG CAP] Take 1 capsule by mouth daily.         Allergies   Allergen Reactions     Simvastatin Unknown        Social History     Tobacco Use     Smoking status: Never Smoker     Smokeless tobacco: Never Used   Substance Use Topics     Alcohol use: Not on file     History   Drug Use Not on file         Objective     /78 (BP Location: Left arm, Patient Position: Sitting, Cuff Size: Adult Regular)   Pulse 78   Ht 1.588 m (5' 2.5\")   Wt 60.5 kg (133 lb 4.8 oz)   SpO2 99%   BMI 23.99 kg/m      EYES: Eyelids, conjunctiva, and sclera were normal. Pupils were normal. Cornea, iris, and lens were normal bilaterally.  HEAD, EARS, NOSE, MOUTH, AND THROAT: Head and face were normal. Hearing was normal to voice and the ears were normal to external exam. Nose appearance was normal and there was no discharge. Oropharynx was normal.  TMs were normal.  NECK: Neck appearance was normal. There were no neck masses and the thyroid was not enlarged.  RESPIRATORY: Breathing pattern was normal and the chest moved symmetrically.   Lung sounds were equal bilaterally.  CARDIOVASCULAR: Heart rate and rhythm were normal.  S1 and S2 were normal and there were no extra sounds or murmurs. Peripheral pulses in arms and legs were normal.  Jugular venous pressure was normal.  There was no peripheral edema.  GASTROINTESTINAL: The abdomen was normal in contour.  Bowel sounds were present.   Palpation detected no tenderness, mass, or enlarged organs.   MUSCULOSKELETAL: Skeletal configuration was normal and muscle mass was normal for age. Joint appearance was overall normal.  LYMPHATIC: There were no enlarged nodes.  SKIN/HAIR/NAILS: Skin color was normal.  There were no abnormal skin lesions.  Hair and nails were normal.  NEUROLOGIC: The patient was alert and oriented to person, place, time, and circumstance. Speech was normal. Cranial nerves were normal. Motor strength was normal for age. The " patient was normally coordinated.  PSYCHIATRIC:  Mood and affect were normal and the patient had normal recent and remote memory. The patient's judgment and insight were normal.      A basic metabolic profile and CBC are pending at the time of this dictation and will be included.    Recent Labs   Lab Test 10/01/21  1425 10/02/20  0908 10/02/20  0908   HGB 13.4 14.4  --      --  142   POTASSIUM 4.3  --  4.8   CR 0.73  --  0.86   TSH 1.20  --  1.48   VITDT 45  --   --        Lab Results   Component Value Date    CHOL 208 10/01/2021        Diagnostics:  New orders:   Orders Placed This Encounter   Procedures     Basic metabolic panel     CBC with platelets         Revised Cardiac Risk Index (RCRI):  The patient has the following serious cardiovascular risks for perioperative complications:   - No serious cardiac risks = 0 points     RCRI Interpretation: 0 points: Class I (very low risk - 0.4% complication rate)          The visit lasted a total of 30 minutes          Signed Electronically by: Lula Landrum MD

## 2022-04-05 PROCEDURE — 88305 TISSUE EXAM BY PATHOLOGIST: CPT | Mod: TC,ORL | Performed by: PLASTIC SURGERY

## 2022-04-06 ENCOUNTER — LAB REQUISITION (OUTPATIENT)
Dept: LAB | Facility: CLINIC | Age: 57
End: 2022-04-06
Payer: COMMERCIAL

## 2022-04-06 PROCEDURE — 88305 TISSUE EXAM BY PATHOLOGIST: CPT | Mod: TC,ORL | Performed by: PLASTIC SURGERY

## 2022-04-07 LAB
PATH REPORT.COMMENTS IMP SPEC: NORMAL
PATH REPORT.COMMENTS IMP SPEC: NORMAL
PATH REPORT.FINAL DX SPEC: NORMAL
PATH REPORT.GROSS SPEC: NORMAL
PATH REPORT.MICROSCOPIC SPEC OTHER STN: NORMAL
PATH REPORT.RELEVANT HX SPEC: NORMAL
PHOTO IMAGE: NORMAL

## 2022-04-07 PROCEDURE — 88305 TISSUE EXAM BY PATHOLOGIST: CPT | Mod: 26 | Performed by: PATHOLOGY

## 2022-05-24 ENCOUNTER — TRANSFERRED RECORDS (OUTPATIENT)
Dept: HEALTH INFORMATION MANAGEMENT | Facility: CLINIC | Age: 57
End: 2022-05-24
Payer: COMMERCIAL

## 2022-09-21 ASSESSMENT — ENCOUNTER SYMPTOMS
PALPITATIONS: 0
DYSURIA: 0
COUGH: 0
HEARTBURN: 0
FREQUENCY: 0
MYALGIAS: 0
NAUSEA: 0
BREAST MASS: 0
DIZZINESS: 0
ARTHRALGIAS: 0
FEVER: 0
WEAKNESS: 0
HEADACHES: 0
DIARRHEA: 0
SHORTNESS OF BREATH: 0
NERVOUS/ANXIOUS: 0
CONSTIPATION: 0
CHILLS: 0
ABDOMINAL PAIN: 0
HEMATURIA: 0
JOINT SWELLING: 0
PARESTHESIAS: 0
HEMATOCHEZIA: 0
EYE PAIN: 0
SORE THROAT: 0

## 2022-09-28 ENCOUNTER — OFFICE VISIT (OUTPATIENT)
Dept: INTERNAL MEDICINE | Facility: CLINIC | Age: 57
End: 2022-09-28
Payer: COMMERCIAL

## 2022-09-28 VITALS
HEART RATE: 69 BPM | SYSTOLIC BLOOD PRESSURE: 110 MMHG | HEIGHT: 62 IN | BODY MASS INDEX: 25.04 KG/M2 | OXYGEN SATURATION: 100 % | WEIGHT: 136.1 LBS | DIASTOLIC BLOOD PRESSURE: 72 MMHG

## 2022-09-28 DIAGNOSIS — Z23 NEED FOR INFLUENZA VACCINATION: ICD-10-CM

## 2022-09-28 DIAGNOSIS — E78.00 HYPERCHOLESTEROLEMIA: ICD-10-CM

## 2022-09-28 DIAGNOSIS — Z78.0 MENOPAUSE: ICD-10-CM

## 2022-09-28 DIAGNOSIS — Z00.00 ENCOUNTER FOR PREVENTIVE CARE: Primary | ICD-10-CM

## 2022-09-28 DIAGNOSIS — E55.9 VITAMIN D DEFICIENCY: ICD-10-CM

## 2022-09-28 LAB
ALBUMIN SERPL BCG-MCNC: 4.5 G/DL (ref 3.5–5.2)
ALP SERPL-CCNC: 59 U/L (ref 35–104)
ALT SERPL W P-5'-P-CCNC: 18 U/L (ref 10–35)
ANION GAP SERPL CALCULATED.3IONS-SCNC: 10 MMOL/L (ref 7–15)
AST SERPL W P-5'-P-CCNC: 31 U/L (ref 10–35)
BILIRUB SERPL-MCNC: 0.5 MG/DL
BUN SERPL-MCNC: 11.6 MG/DL (ref 6–20)
CALCIUM SERPL-MCNC: 9.5 MG/DL (ref 8.6–10)
CHLORIDE SERPL-SCNC: 104 MMOL/L (ref 98–107)
CHOLEST SERPL-MCNC: 208 MG/DL
CREAT SERPL-MCNC: 0.84 MG/DL (ref 0.51–0.95)
DEPRECATED CALCIDIOL+CALCIFEROL SERPL-MC: 57 UG/L (ref 20–75)
DEPRECATED HCO3 PLAS-SCNC: 28 MMOL/L (ref 22–29)
ERYTHROCYTE [DISTWIDTH] IN BLOOD BY AUTOMATED COUNT: 12.4 % (ref 10–15)
GFR SERPL CREATININE-BSD FRML MDRD: 81 ML/MIN/1.73M2
GLUCOSE SERPL-MCNC: 103 MG/DL (ref 70–99)
HBA1C MFR BLD: 5.5 % (ref 0–5.6)
HCT VFR BLD AUTO: 42.3 % (ref 35–47)
HDLC SERPL-MCNC: 83 MG/DL
HGB BLD-MCNC: 14 G/DL (ref 11.7–15.7)
LDLC SERPL CALC-MCNC: 106 MG/DL
MCH RBC QN AUTO: 30.6 PG (ref 26.5–33)
MCHC RBC AUTO-ENTMCNC: 33.1 G/DL (ref 31.5–36.5)
MCV RBC AUTO: 92 FL (ref 78–100)
NONHDLC SERPL-MCNC: 125 MG/DL
PLATELET # BLD AUTO: 237 10E3/UL (ref 150–450)
POTASSIUM SERPL-SCNC: 4.4 MMOL/L (ref 3.4–5.3)
PROT SERPL-MCNC: 7.2 G/DL (ref 6.4–8.3)
RBC # BLD AUTO: 4.58 10E6/UL (ref 3.8–5.2)
SODIUM SERPL-SCNC: 142 MMOL/L (ref 136–145)
TRIGL SERPL-MCNC: 97 MG/DL
TSH SERPL DL<=0.005 MIU/L-ACNC: 2.01 UIU/ML (ref 0.3–4.2)
WBC # BLD AUTO: 5.5 10E3/UL (ref 4–11)

## 2022-09-28 PROCEDURE — 82306 VITAMIN D 25 HYDROXY: CPT | Performed by: INTERNAL MEDICINE

## 2022-09-28 PROCEDURE — 36415 COLL VENOUS BLD VENIPUNCTURE: CPT | Performed by: INTERNAL MEDICINE

## 2022-09-28 PROCEDURE — 99213 OFFICE O/P EST LOW 20 MIN: CPT | Mod: 25 | Performed by: INTERNAL MEDICINE

## 2022-09-28 PROCEDURE — 80061 LIPID PANEL: CPT | Performed by: INTERNAL MEDICINE

## 2022-09-28 PROCEDURE — 99396 PREV VISIT EST AGE 40-64: CPT | Mod: 25 | Performed by: INTERNAL MEDICINE

## 2022-09-28 PROCEDURE — 83036 HEMOGLOBIN GLYCOSYLATED A1C: CPT | Performed by: INTERNAL MEDICINE

## 2022-09-28 PROCEDURE — 80053 COMPREHEN METABOLIC PANEL: CPT | Performed by: INTERNAL MEDICINE

## 2022-09-28 PROCEDURE — 84443 ASSAY THYROID STIM HORMONE: CPT | Performed by: INTERNAL MEDICINE

## 2022-09-28 PROCEDURE — 90471 IMMUNIZATION ADMIN: CPT | Performed by: INTERNAL MEDICINE

## 2022-09-28 PROCEDURE — 90682 RIV4 VACC RECOMBINANT DNA IM: CPT | Performed by: INTERNAL MEDICINE

## 2022-09-28 PROCEDURE — 85027 COMPLETE CBC AUTOMATED: CPT | Performed by: INTERNAL MEDICINE

## 2022-09-28 RX ORDER — ATORVASTATIN CALCIUM 10 MG/1
10 TABLET, FILM COATED ORAL DAILY
Qty: 90 TABLET | Refills: 2 | Status: SHIPPED | OUTPATIENT
Start: 2022-09-28 | End: 2022-12-16

## 2022-09-28 NOTE — PROGRESS NOTES
"Yearly Preventive Health Visit--Face to Face    ASSESSMENT/PLAN:     ASSESSMENT and PLAN:  1. Encounter for preventive care  She is up-to-date on ophthalmologic, dental and Derm care.  Colon cancer screening is up-to-date.  Mammography is due in December.  She is due for an update on bone mineral density.    She is exercising vigilantly.  She is enjoying her long-term.  BMI is within normal limits.    - CBC with platelets; Future  - Comprehensive metabolic panel; Future  - Hemoglobin A1c; Future  - Lipid panel reflex to direct LDL Fasting; Future  - TSH with free T4 reflex; Future  - CBC with platelets  - Comprehensive metabolic panel  - Hemoglobin A1c  - Lipid panel reflex to direct LDL Fasting  - TSH with free T4 reflex    2. Hypercholesterolemia  We will update labs.-Continue Lipitor.  May discontinue aspirin  - Lipid panel reflex to direct LDL Fasting; Future  - TSH with free T4 reflex; Future  - Lipid panel reflex to direct LDL Fasting  - TSH with free T4 reflex  - atorvastatin (LIPITOR) 10 MG tablet; Take 1 tablet (10 mg) by mouth daily  Dispense: 90 tablet; Refill: 2    3. Vitamin D deficiency    - Vitamin D Deficiency; Future  - Vitamin D Deficiency          COUNSELING:   Reviewed preventive health counseling, as reflected in patient instructions  Special attention given to: Regular daily exercise    Estimated body mass index is 23.99 kg/m  as calculated from the following:    Height as of 3/18/22: 1.588 m (5' 2.5\").    Weight as of 3/18/22: 60.5 kg (133 lb 4.8 oz).           SUBJECTIVE:     Britney Groves is an 56 year old who presents for preventative health visit.     Patient has been advised of split billing requirements and indicates understanding: Yes  Healthy Habits:     Getting at least 3 servings of Calcium per day:  Yes    Bi-annual eye exam:  Yes    Dental care twice a year:  Yes    Sleep apnea or symptoms of sleep apnea:  None    Diet:  Regular (no restrictions)    Frequency of exercise:  6-7 " days/week    Duration of exercise:  45-60 minutes    Taking medications regularly:  Yes    Barriers to taking medications:  None    Medication side effects:  None    PHQ-2 Total Score: 0    Additional concerns today:  No        Today's PHQ-2 Score:   PHQ-2 ( 1999 [employer]) 9/21/2022   Q1: Little interest or pleasure in doing things 0   Q2: Feeling down, depressed or hopeless 0   PHQ-2 Score 0   PHQ-2 Total Score (12-17 Years)- Positive if 3 or more points; Administer PHQ-A if positive -   Q1: Little interest or pleasure in doing things Not at all   Q2: Feeling down, depressed or hopeless Not at all   PHQ-2 Score 0       Abuse: Current or Past(Physical, Sexual or Emotional)- No   Do you feel safe in your environment? Yes     Have you ever done Advance Care Planning? (For example, a Health Directive, POLST, or a discussion with a medical provider or your loved ones about your wishes): No, advance care planning information given to patient to review.  Patient plans to discuss their wishes with loved ones or provider.      Reviewed and updated as needed this visit by clinical staff   Tobacco   Meds                CHIEF COMPLAINT:  Chief Complaint   Patient presents with     Physical       HPI: Britney is a delightful 56-year-old female who is here today for preventative health examination.  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  She is enjoying her time off.    She has absolutely no chief complaints.  She did lose her eyelashes during a wedding and is wondering when they will be restored.    Otherwise, she denies chest pain, shortness of breath, any new bowel or bladder symptoms.  She had a successful breast reduction and from a physical point of view is doing much better.  She no longer has neck and shoulder pain.Answers for HPI/ROS submitted by the patient on 9/21/2022  abdominal pain: No  Blood in stool: No  Blood in urine: No  chest pain: No  chills: No  congestion: No  constipation: No  cough:  No  diarrhea: No  dizziness: No  ear pain: No  eye pain: No  nervous/anxious: No  fever: No  frequency: No  genital sores: No  headaches: No  hearing loss: No  heartburn: No  arthralgias: No  joint swelling: No  peripheral edema: No  mood changes: No  myalgias: No  nausea: No  dysuria: No  palpitations: No  Skin sensation changes: No  sore throat: No  urgency: No  rash: No  shortness of breath: No  visual disturbance: No  weakness: No  pelvic pain: No  vaginal bleeding: No  vaginal discharge: No  tenderness: No  breast mass: No  breast discharge: No        Review of Systems: See above    Patient Care Team:  Lula Landrum MD as PCP - General (Internal Medicine)  Lula Landrum MD as Assigned PCP     Patient Active Problem List   Diagnosis     Hypercholesterolemia     Sinus Tachycardia     Past Medical History:   Diagnosis Date     Breast cyst 2010     Chest pain       No past surgical history on file.   Family History   Problem Relation Age of Onset     Lung Cancer Father      Hyperlipidemia Mother      Hypertension Mother      Anxiety Disorder Mother      Graves' disease Sister      Attention Deficit Disorder Daughter      Depression Daughter       Social History     Socioeconomic History     Marital status:      Spouse name: Not on file     Number of children: Not on file     Years of education: Not on file     Highest education level: Not on file   Occupational History     Not on file   Tobacco Use     Smoking status: Never Smoker     Smokeless tobacco: Never Used   Substance and Sexual Activity     Alcohol use: Not on file     Drug use: Not on file     Sexual activity: Not on file   Other Topics Concern     Not on file   Social History Narrative     Not on file     Social Determinants of Health     Financial Resource Strain: Not on file   Food Insecurity: Not on file   Transportation Needs: Not on file   Physical Activity: Not on file   Stress: Not on file   Social Connections: Not on file   Intimate  Partner Violence: Not on file   Housing Stability: Not on file        Social History     Social History Narrative     Not on file       Current Outpatient Medications   Medication Sig Dispense Refill     aspirin 81 MG EC tablet [ASPIRIN 81 MG EC TABLET] Take 81 mg by mouth daily.       atorvastatin (LIPITOR) 10 MG tablet Take 1 tablet (10 mg) by mouth daily 90 tablet 2     calcium citrate-vitamin D (CITRACAL+D) 315-200 mg-unit per tablet [CALCIUM CITRATE-VITAMIN D (CITRACAL+D) 315-200 MG-UNIT PER TABLET] Take 1 tablet by mouth 2 (two) times a day.       multivitamin capsule [MULTIVITAMIN CAPSULE] Take 1 capsule by mouth daily.       omega-3 fatty acids-fish oil (FISH OIL) 360-1,200 mg cap [OMEGA-3 FATTY ACIDS-FISH OIL (FISH OIL) 360-1,200 MG CAP] Take 1 capsule by mouth daily.            OBJECTIVE:   VITALS:  There were no vitals filed for this visit.  EYES: Eyelids, conjunctiva, and sclera were normal. Pupils were normal. Cornea, iris, and lens were normal bilaterally.  HEAD, EARS, NOSE, MOUTH, AND THROAT: Head and face were normal. Hearing was normal to voice and the ears were normal to external exam. Nose appearance was normal and there was no discharge. Oropharynx was normal.  TMs were normal.  NECK: Neck appearance was normal. There were no neck masses and the thyroid was not enlarged.  RESPIRATORY: Breathing pattern was normal and the chest moved symmetrically.   Lung sounds were equal bilaterally.  CARDIOVASCULAR: Heart rate and rhythm were normal.  S1 and S2 were normal and there were no extra sounds or murmurs. Peripheral pulses in arms and legs were normal.  Jugular venous pressure was normal.  There was no peripheral edema.  GASTROINTESTINAL: The abdomen was normal in contour.  Bowel sounds were present.   Palpation detected no tenderness, mass, or enlarged organs.   MUSCULOSKELETAL: Skeletal configuration was normal and muscle mass was normal for age. Joint appearance was overall normal.  LYMPHATIC:  There were no enlarged nodes.  SKIN/HAIR/NAILS: Skin color was normal.  There were no abnormal skin lesions.  Hair and nails were normal.  NEUROLOGIC: The patient was alert and oriented to person, place, time, and circumstance. Speech was normal. Cranial nerves were normal. Motor strength was normal for age. The patient was normally coordinated.  PSYCHIATRIC:  Mood and affect were normal and the patient had normal recent and remote memory. The patient's judgment and insight were normal.      A breast exam is performed.  The scars are well-healed.  No masses, nipple discharge or axillary adenopathy is noted.        She reports that she has never smoked. She has never used smokeless tobacco.      Recent Labs   Lab Test 09/28/22  0806 03/18/22  0748 10/01/21  1425 10/02/20  0908 10/02/20  0908   HGB 14.0 13.8 13.4   < >  --    WBC 5.5 5.0 5.5   < >  --    NA  --  142 142  --  142   POTASSIUM  --  4.5 4.3  --  4.8   CR  --  0.83 0.73  --  0.86   A1C 5.5  --   --   --   --    TSH  --   --  1.20  --  1.48   VITDT  --   --  45  --   --     < > = values in this interval not displayed.               Lula Landrum MD  [facility]

## 2022-11-11 ENCOUNTER — HOSPITAL ENCOUNTER (OUTPATIENT)
Dept: BONE DENSITY | Facility: HOSPITAL | Age: 57
Discharge: HOME | End: 2022-11-11
Attending: INTERNAL MEDICINE | Admitting: INTERNAL MEDICINE
Payer: COMMERCIAL

## 2022-11-11 DIAGNOSIS — Z78.0 MENOPAUSE: ICD-10-CM

## 2022-11-11 PROCEDURE — 77080 DXA BONE DENSITY AXIAL: CPT

## 2022-12-07 ENCOUNTER — ANCILLARY PROCEDURE (OUTPATIENT)
Dept: MAMMOGRAPHY | Facility: CLINIC | Age: 57
End: 2022-12-07
Attending: INTERNAL MEDICINE
Payer: COMMERCIAL

## 2022-12-07 DIAGNOSIS — Z12.31 VISIT FOR SCREENING MAMMOGRAM: ICD-10-CM

## 2022-12-07 PROCEDURE — 77067 SCR MAMMO BI INCL CAD: CPT

## 2022-12-16 ENCOUNTER — MYC MEDICAL ADVICE (OUTPATIENT)
Dept: INTERNAL MEDICINE | Facility: CLINIC | Age: 57
End: 2022-12-16

## 2022-12-16 ENCOUNTER — TELEPHONE (OUTPATIENT)
Dept: INTERNAL MEDICINE | Facility: CLINIC | Age: 57
End: 2022-12-16

## 2022-12-16 DIAGNOSIS — E78.00 HYPERCHOLESTEROLEMIA: ICD-10-CM

## 2022-12-16 RX ORDER — ATORVASTATIN CALCIUM 10 MG/1
10 TABLET, FILM COATED ORAL DAILY
Qty: 90 TABLET | Refills: 3 | Status: SHIPPED | OUTPATIENT
Start: 2022-12-16 | End: 2024-02-05

## 2022-12-16 NOTE — TELEPHONE ENCOUNTER
I'm currently unable to take more patients on.  For acute care, she can schedule appointment with a new provider in our clinic who is available.  For long-term care, please give her information on a new provider that we will be starting here in January.

## 2023-03-08 ENCOUNTER — OFFICE VISIT (OUTPATIENT)
Dept: INTERNAL MEDICINE | Facility: CLINIC | Age: 58
End: 2023-03-08
Payer: COMMERCIAL

## 2023-03-08 VITALS
TEMPERATURE: 98.3 F | DIASTOLIC BLOOD PRESSURE: 69 MMHG | RESPIRATION RATE: 16 BRPM | WEIGHT: 137.4 LBS | SYSTOLIC BLOOD PRESSURE: 108 MMHG | HEIGHT: 63 IN | HEART RATE: 85 BPM | BODY MASS INDEX: 24.34 KG/M2 | OXYGEN SATURATION: 100 %

## 2023-03-08 DIAGNOSIS — E78.2 MIXED HYPERLIPIDEMIA: Primary | ICD-10-CM

## 2023-03-08 DIAGNOSIS — G47.00 INSOMNIA, UNSPECIFIED TYPE: ICD-10-CM

## 2023-03-08 DIAGNOSIS — R73.09 ABNORMAL GLUCOSE: ICD-10-CM

## 2023-03-08 LAB
CHOLEST SERPL-MCNC: 227 MG/DL
HBA1C MFR BLD: 5.5 % (ref 0–5.6)
HDLC SERPL-MCNC: 91 MG/DL
LDLC SERPL CALC-MCNC: 115 MG/DL
NONHDLC SERPL-MCNC: 136 MG/DL
TRIGL SERPL-MCNC: 103 MG/DL

## 2023-03-08 PROCEDURE — 83036 HEMOGLOBIN GLYCOSYLATED A1C: CPT | Performed by: INTERNAL MEDICINE

## 2023-03-08 PROCEDURE — 99213 OFFICE O/P EST LOW 20 MIN: CPT | Performed by: INTERNAL MEDICINE

## 2023-03-08 PROCEDURE — 36415 COLL VENOUS BLD VENIPUNCTURE: CPT | Performed by: INTERNAL MEDICINE

## 2023-03-08 PROCEDURE — 80061 LIPID PANEL: CPT | Performed by: INTERNAL MEDICINE

## 2023-03-08 NOTE — PATIENT INSTRUCTIONS
Do not watch TV in bed. Try reading. Can try white noise fuentes on the phone. Can try supplements: Magnesium Glycinate 500 mg at bed time. If snaking, use sleep promoting fruits in the evening. If a sleep crises, can give a sleeping pill, but not as good long term.    2. Will check cholesterol today.

## 2023-03-08 NOTE — PROGRESS NOTES
Assessment & Plan     Mixed hyperlipidemia  She is on Lipitor and has been on it since her 40s.  She is wondering if she should continue on it, I am not sure how high her levels were when it was initially started.  For now would continue on Lipitor.  Since she lost weight, will check lipid panel.  - Lipid panel reflex to direct LDL Fasting    Abnormal glucose  History of slightly prediabetic sugar in the past, will repeat  - Hemoglobin A1c    Insomnia, unspecified type  Sleep hygiene and supplements like melatonin and magnesium glycine 8 discussed.  If she has insomnia crisis, we could certainly try a sleeping pill but she wants to try without it for now.      Return in about 1 year (around 3/8/2024) for Routine preventive.    Margarita Knight MD  [facility]    Subjective   Britney is a 57 year old accompanied by her alone, presenting for the following health issues:  Establish Care and Recheck Medication (Pt reports that she is here to establish care.And would like labs to be drawn.)    Britney is a 57-year-old female with history of hyperlipidemia and vitamin D deficiency who is currently here to establish care.    Her only concern today is difficulty sleeping.  Usually in the evening she watches TV in bed with her  and then has difficulty falling asleep.  She is a light sleeper as well and a lot of different noises from her dog or her  who snores wake her up.  She denies any breathing problems at nighttime.    She also made some dietary changes and would like to check her cholesterol level today.    History of Present Illness       Reason for visit:  Meet and greet with new doctor, discuss my general health    She eats 4 or more servings of fruits and vegetables daily.She consumes 0 sweetened beverage(s) daily.She exercises with enough effort to increase her heart rate 30 to 60 minutes per day.  She exercises with enough effort to increase her heart rate 5 days per  "week.   She is taking medications regularly.       Pt reports that she is here to establish care and would like labs drawn.      Review of Systems   As above      Objective    /69 (BP Location: Left arm, Patient Position: Sitting, Cuff Size: Adult Regular)   Pulse 85   Temp 98.3  F (36.8  C) (Oral)   Resp 16   Ht 1.588 m (5' 2.5\")   Wt 62.3 kg (137 lb 6.4 oz)   SpO2 100%   BMI 24.73 kg/m    Body mass index is 24.73 kg/m .  Physical Exam   General: well appearing female, alert and oriented x3  EYES: Eyelids, conjunctiva, and sclera were normal. Pupils were normal.   HEAD, EARS, NOSE, MOUTH, AND THROAT: no cervical LAD, no thyromegaly or nodules appreciated. TMs are visualized and normal, oropharynx is clear.  RESPIRATORY: respirations non labored, CTA bl, no wheezes, rales, no forced expiratory wheezing.  CARDIOVASCULAR: Heart rate and rhythm were normal. No murmurs, rubs,gallops. There was no peripheral edema.  GASTROINTESTINAL: Positive bowel sounds, abdomen is soft, non tender, non distended.     MUSCULOSKELETAL: Muscle mass was normal for age. No joint synovitis or deformity.  LYMPHATIC: There were no enlarged nodes palpable.  SKIN/HAIR/NAILS: Skin color was normal.  No rashes.  NEUROLOGIC: The patient was alert and oriented.  Speech was normal.  There is no facial asymmetry.   PSYCHIATRIC:  Mood and affect were normal.         "

## 2023-05-19 ENCOUNTER — OFFICE VISIT (OUTPATIENT)
Dept: INTERNAL MEDICINE | Facility: CLINIC | Age: 58
End: 2023-05-19
Payer: COMMERCIAL

## 2023-05-19 VITALS
TEMPERATURE: 97.7 F | DIASTOLIC BLOOD PRESSURE: 64 MMHG | RESPIRATION RATE: 16 BRPM | HEIGHT: 62 IN | OXYGEN SATURATION: 99 % | BODY MASS INDEX: 25.41 KG/M2 | HEART RATE: 75 BPM | WEIGHT: 138.1 LBS | SYSTOLIC BLOOD PRESSURE: 108 MMHG

## 2023-05-19 DIAGNOSIS — R31.0 GROSS HEMATURIA: Primary | ICD-10-CM

## 2023-05-19 LAB
ALBUMIN UR-MCNC: NEGATIVE MG/DL
ANION GAP SERPL CALCULATED.3IONS-SCNC: 12 MMOL/L (ref 7–15)
APPEARANCE UR: CLEAR
BACTERIA #/AREA URNS HPF: ABNORMAL /HPF
BASOPHILS # BLD AUTO: 0 10E3/UL (ref 0–0.2)
BASOPHILS NFR BLD AUTO: 0 %
BILIRUB UR QL STRIP: NEGATIVE
BUN SERPL-MCNC: 14.8 MG/DL (ref 6–20)
CALCIUM SERPL-MCNC: 9.2 MG/DL (ref 8.6–10)
CHLORIDE SERPL-SCNC: 103 MMOL/L (ref 98–107)
COLOR UR AUTO: YELLOW
CREAT SERPL-MCNC: 0.81 MG/DL (ref 0.51–0.95)
DEPRECATED HCO3 PLAS-SCNC: 26 MMOL/L (ref 22–29)
EOSINOPHIL # BLD AUTO: 0.2 10E3/UL (ref 0–0.7)
EOSINOPHIL NFR BLD AUTO: 3 %
ERYTHROCYTE [DISTWIDTH] IN BLOOD BY AUTOMATED COUNT: 12.1 % (ref 10–15)
GFR SERPL CREATININE-BSD FRML MDRD: 84 ML/MIN/1.73M2
GLUCOSE SERPL-MCNC: 81 MG/DL (ref 70–99)
GLUCOSE UR STRIP-MCNC: NEGATIVE MG/DL
HCT VFR BLD AUTO: 42.9 % (ref 35–47)
HGB BLD-MCNC: 14.1 G/DL (ref 11.7–15.7)
HGB UR QL STRIP: NEGATIVE
IMM GRANULOCYTES # BLD: 0 10E3/UL
IMM GRANULOCYTES NFR BLD: 0 %
KETONES UR STRIP-MCNC: NEGATIVE MG/DL
LEUKOCYTE ESTERASE UR QL STRIP: ABNORMAL
LYMPHOCYTES # BLD AUTO: 2.6 10E3/UL (ref 0.8–5.3)
LYMPHOCYTES NFR BLD AUTO: 37 %
MCH RBC QN AUTO: 30.4 PG (ref 26.5–33)
MCHC RBC AUTO-ENTMCNC: 32.9 G/DL (ref 31.5–36.5)
MCV RBC AUTO: 93 FL (ref 78–100)
MONOCYTES # BLD AUTO: 0.4 10E3/UL (ref 0–1.3)
MONOCYTES NFR BLD AUTO: 6 %
NEUTROPHILS # BLD AUTO: 3.9 10E3/UL (ref 1.6–8.3)
NEUTROPHILS NFR BLD AUTO: 55 %
NITRATE UR QL: NEGATIVE
PH UR STRIP: 7 [PH] (ref 5–8)
PLATELET # BLD AUTO: 255 10E3/UL (ref 150–450)
POTASSIUM SERPL-SCNC: 4.3 MMOL/L (ref 3.4–5.3)
RBC # BLD AUTO: 4.64 10E6/UL (ref 3.8–5.2)
RBC #/AREA URNS AUTO: ABNORMAL /HPF
SODIUM SERPL-SCNC: 141 MMOL/L (ref 136–145)
SP GR UR STRIP: 1.01 (ref 1–1.03)
SQUAMOUS #/AREA URNS AUTO: ABNORMAL /LPF
UROBILINOGEN UR STRIP-ACNC: 0.2 E.U./DL
WBC # BLD AUTO: 7.1 10E3/UL (ref 4–11)
WBC #/AREA URNS AUTO: ABNORMAL /HPF

## 2023-05-19 PROCEDURE — 85025 COMPLETE CBC W/AUTO DIFF WBC: CPT | Performed by: INTERNAL MEDICINE

## 2023-05-19 PROCEDURE — 99214 OFFICE O/P EST MOD 30 MIN: CPT | Performed by: INTERNAL MEDICINE

## 2023-05-19 PROCEDURE — 36415 COLL VENOUS BLD VENIPUNCTURE: CPT | Performed by: INTERNAL MEDICINE

## 2023-05-19 PROCEDURE — 81001 URINALYSIS AUTO W/SCOPE: CPT | Performed by: INTERNAL MEDICINE

## 2023-05-19 PROCEDURE — 80048 BASIC METABOLIC PNL TOTAL CA: CPT | Performed by: INTERNAL MEDICINE

## 2023-05-19 NOTE — PATIENT INSTRUCTIONS
For blood in the urine: see [facility]: [phone] at [facility] to check out bladder. Also schedule iamging to check kidneys , stones: CT scan    2. Let me know if pelvic pain reoccurs.    3. Lab today.     4. Each dairy portion is 300 mg of calcium, we need total of 1200 mg of calcium.

## 2023-05-19 NOTE — PROGRESS NOTES
"  Assessment & Plan     Gross hematuria  Self-limited episode of gross hematuria and lower pelvic pain.  Urinalysis and urine culture is not consistent with UTI.  Currently bleeding has resolved however given her age warrants further work-up.  She will need cystoscopy and referral to urology was provided, will also order CT scan to evaluate her kidneys.  - Adult Urology  Referral; Future  -- CBC with platelets and differential  - Basic metabolic panel  (Ca, Cl, CO2, Creat, Gluc, K, Na, BUN)  - UA with Microscopic reflex to Culture - lab collect  - UA Microscopic with Reflex to Culture     BMI:   Estimated body mass index is 25.26 kg/m  as calculated from the following:    Height as of this encounter: 1.575 m (5' 2\").    Weight as of this encounter: 62.6 kg (138 lb 1.6 oz).     Margarita Knight MD  [facility]   Britney is a 57 year old, presenting for the following health issues:  office visit and Recheck Medication (Pt reports that she may have a possible UTI and is following up from [facility].)        5/19/2023     2:44 PM   Additional Questions   Roomed by garry   Accompanied by alone         5/19/2023     2:44 PM   Patient Reported Additional Medications   Patient reports taking the following new medications none     History of Present Illness       Reason for visit:  Following to an UTI diagnosis at an Urgent Care 5-  Symptom onset:  1-3 days ago  Symptoms include:  Had pink/brown urine, frequent need to urinate  & pelvic pressure  Symptom intensity:  Moderate  Symptom progression:  Improving  Had these symptoms before:  No  What makes it worse:  The pressure increased after exercise  What makes it better:  Drinking water & resting    She eats 2-3 servings of fruits and vegetables daily.She consumes 0 sweetened beverage(s) daily.She exercises with enough effort to increase her heart rate 30 to 60 minutes per day.  She exercises with enough " "effort to increase her heart rate 6 days per week.   She is taking medications regularly.     Britney is a 57-year-old female with history of hyperlipidemia and vitamin D deficiency .  She is currently here for acute visit.  Pt reports being seen at [facility] urgent care on May 10th and wants to follow up for possible UTI, and does have Urgent care notes. Pt also reports that she also has concerns of blood shot Rt eye.    She did well pink looking urine and a lot of pelvic pressure several days ago.  She came to the emergency room and had urinalysis done in the ER which showed 100+ red blood cells but no leukocyte esterase or nitrates.  Due to pelvic pressure she was prescribed nitrofurantoin which she took for 5 days.  Urine culture did not grow any bacteria.  Urine has cleared and pelvic pressure has resolved.  Prior to onset of symptoms she denies any flank pain or upper abdominal pain and has no history of kidney stones.  She denies any vaginal spotting or bleeding.  She is not on aspirin and ibuprofen but does take fish oil regularly      Review of Systems   As above      Objective    /64 (BP Location: Left arm, Patient Position: Sitting, Cuff Size: Adult Regular)   Pulse 75   Temp 97.7  F (36.5  C) (Tympanic)   Resp 16   Ht 1.575 m (5' 2\")   Wt 62.6 kg (138 lb 1.6 oz)   LMP  (LMP Unknown)   SpO2 99%   BMI 25.26 kg/m    Body mass index is 25.26 kg/m .  Physical Exam   General: well appearing female, alert and oriented x3  EYES: Eyelids, conjunctiva, and sclera were normal. Pupils were normal.   HEAD, EARS, NOSE, MOUTH, AND THROAT: no cervical LAD, no thyromegaly or nodules appreciated. TMs are visualized and normal, oropharynx is clear.  RESPIRATORY: respirations non labored, CTA bl, no wheezes, rales, no forced expiratory wheezing.  CARDIOVASCULAR: Heart rate and rhythm were normal. No murmurs, rubs,gallops. There was no peripheral edema.   GASTROINTESTINAL: Positive bowel sounds, abdomen is soft, non " tender, non distended.   No CVA tenderness.  MUSCULOSKELETAL: Muscle mass was normal for age. No joint synovitis or deformity.  LYMPHATIC: There were no enlarged nodes palpable.  SKIN/HAIR/NAILS: Skin color was normal.  No rashes.  NEUROLOGIC: The patient was alert and oriented.  Speech was normal.  There is no facial asymmetry.   PSYCHIATRIC:  Mood and affect were normal.

## 2023-05-22 ENCOUNTER — HOSPITAL ENCOUNTER (OUTPATIENT)
Dept: CT IMAGING | Facility: HOSPITAL | Age: 58
Discharge: HOME | End: 2023-05-22
Attending: INTERNAL MEDICINE | Admitting: INTERNAL MEDICINE
Payer: COMMERCIAL

## 2023-05-22 DIAGNOSIS — R31.0 GROSS HEMATURIA: ICD-10-CM

## 2023-05-22 PROCEDURE — 74178 CT ABD&PLV WO CNTR FLWD CNTR: CPT

## 2023-05-22 PROCEDURE — 250N000011 HC RX IP 250 OP 636: Performed by: INTERNAL MEDICINE

## 2023-05-22 RX ORDER — IOPAMIDOL 755 MG/ML
90 INJECTION, SOLUTION INTRAVASCULAR ONCE
Status: COMPLETED | OUTPATIENT
Start: 2023-05-22 | End: 2023-05-22

## 2023-05-22 RX ADMIN — IOPAMIDOL 90 ML: 755 INJECTION, SOLUTION INTRAVENOUS at 16:17

## 2023-06-29 ENCOUNTER — TRANSFERRED RECORDS (OUTPATIENT)
Dept: HEALTH INFORMATION MANAGEMENT | Facility: CLINIC | Age: 58
End: 2023-06-29
Payer: COMMERCIAL

## 2023-09-26 ASSESSMENT — ENCOUNTER SYMPTOMS
DIARRHEA: 0
FREQUENCY: 0
HEMATURIA: 0
CHILLS: 0
HEADACHES: 0
BREAST MASS: 0
PALPITATIONS: 0
CONSTIPATION: 0
MYALGIAS: 0
NERVOUS/ANXIOUS: 0
DYSURIA: 0
JOINT SWELLING: 0
ARTHRALGIAS: 0
PARESTHESIAS: 0
DIZZINESS: 0
SORE THROAT: 0
FEVER: 0
ABDOMINAL PAIN: 0
EYE PAIN: 0
HEARTBURN: 0
COUGH: 0
NAUSEA: 0
HEMATOCHEZIA: 0
WEAKNESS: 0
SHORTNESS OF BREATH: 0

## 2023-10-02 ENCOUNTER — OFFICE VISIT (OUTPATIENT)
Dept: INTERNAL MEDICINE | Facility: CLINIC | Age: 58
End: 2023-10-02
Payer: COMMERCIAL

## 2023-10-02 ENCOUNTER — MYC MEDICAL ADVICE (OUTPATIENT)
Dept: INTERNAL MEDICINE | Facility: CLINIC | Age: 58
End: 2023-10-02

## 2023-10-02 VITALS
TEMPERATURE: 98.3 F | WEIGHT: 138.7 LBS | RESPIRATION RATE: 16 BRPM | OXYGEN SATURATION: 98 % | SYSTOLIC BLOOD PRESSURE: 111 MMHG | DIASTOLIC BLOOD PRESSURE: 71 MMHG | HEART RATE: 70 BPM | BODY MASS INDEX: 25.52 KG/M2 | HEIGHT: 62 IN

## 2023-10-02 DIAGNOSIS — Z00.00 ANNUAL PHYSICAL EXAM: Primary | ICD-10-CM

## 2023-10-02 DIAGNOSIS — Z12.31 ENCOUNTER FOR SCREENING MAMMOGRAM FOR BREAST CANCER: ICD-10-CM

## 2023-10-02 DIAGNOSIS — I49.3 PVC (PREMATURE VENTRICULAR CONTRACTION): ICD-10-CM

## 2023-10-02 DIAGNOSIS — R31.9 HEMATURIA, UNSPECIFIED TYPE: ICD-10-CM

## 2023-10-02 DIAGNOSIS — E55.9 VITAMIN D DEFICIENCY: ICD-10-CM

## 2023-10-02 DIAGNOSIS — E78.5 HYPERLIPIDEMIA LDL GOAL <100: ICD-10-CM

## 2023-10-02 LAB
ALBUMIN SERPL BCG-MCNC: 4.5 G/DL (ref 3.5–5.2)
ALBUMIN UR-MCNC: NEGATIVE MG/DL
ALP SERPL-CCNC: 56 U/L (ref 35–104)
ALT SERPL W P-5'-P-CCNC: 17 U/L (ref 0–50)
ANION GAP SERPL CALCULATED.3IONS-SCNC: 10 MMOL/L (ref 7–15)
APPEARANCE UR: CLEAR
AST SERPL W P-5'-P-CCNC: 23 U/L (ref 0–45)
BACTERIA #/AREA URNS HPF: NORMAL /HPF
BASOPHILS # BLD AUTO: 0 10E3/UL (ref 0–0.2)
BASOPHILS NFR BLD AUTO: 1 %
BILIRUB SERPL-MCNC: 0.5 MG/DL
BILIRUB UR QL STRIP: NEGATIVE
BUN SERPL-MCNC: 11.3 MG/DL (ref 6–20)
CALCIUM SERPL-MCNC: 9.7 MG/DL (ref 8.6–10)
CHLORIDE SERPL-SCNC: 107 MMOL/L (ref 98–107)
CHOLEST SERPL-MCNC: 204 MG/DL
COLOR UR AUTO: YELLOW
CREAT SERPL-MCNC: 0.78 MG/DL (ref 0.51–0.95)
DEPRECATED HCO3 PLAS-SCNC: 28 MMOL/L (ref 22–29)
EGFRCR SERPLBLD CKD-EPI 2021: 88 ML/MIN/1.73M2
EOSINOPHIL # BLD AUTO: 0.1 10E3/UL (ref 0–0.7)
EOSINOPHIL NFR BLD AUTO: 2 %
ERYTHROCYTE [DISTWIDTH] IN BLOOD BY AUTOMATED COUNT: 12.1 % (ref 10–15)
GLUCOSE SERPL-MCNC: 101 MG/DL (ref 70–99)
GLUCOSE UR STRIP-MCNC: NEGATIVE MG/DL
HCT VFR BLD AUTO: 41.8 % (ref 35–47)
HDLC SERPL-MCNC: 82 MG/DL
HGB BLD-MCNC: 13.7 G/DL (ref 11.7–15.7)
HGB UR QL STRIP: NEGATIVE
IMM GRANULOCYTES # BLD: 0 10E3/UL
IMM GRANULOCYTES NFR BLD: 0 %
KETONES UR STRIP-MCNC: NEGATIVE MG/DL
LDLC SERPL CALC-MCNC: 102 MG/DL
LEUKOCYTE ESTERASE UR QL STRIP: NEGATIVE
LYMPHOCYTES # BLD AUTO: 1.9 10E3/UL (ref 0.8–5.3)
LYMPHOCYTES NFR BLD AUTO: 34 %
MCH RBC QN AUTO: 30.9 PG (ref 26.5–33)
MCHC RBC AUTO-ENTMCNC: 32.8 G/DL (ref 31.5–36.5)
MCV RBC AUTO: 94 FL (ref 78–100)
MONOCYTES # BLD AUTO: 0.3 10E3/UL (ref 0–1.3)
MONOCYTES NFR BLD AUTO: 6 %
NEUTROPHILS # BLD AUTO: 3.2 10E3/UL (ref 1.6–8.3)
NEUTROPHILS NFR BLD AUTO: 58 %
NITRATE UR QL: NEGATIVE
NONHDLC SERPL-MCNC: 122 MG/DL
PH UR STRIP: 7 [PH] (ref 5–8)
PLATELET # BLD AUTO: 233 10E3/UL (ref 150–450)
POTASSIUM SERPL-SCNC: 5 MMOL/L (ref 3.4–5.3)
PROT SERPL-MCNC: 6.9 G/DL (ref 6.4–8.3)
RBC # BLD AUTO: 4.44 10E6/UL (ref 3.8–5.2)
RBC #/AREA URNS AUTO: NORMAL /HPF
SODIUM SERPL-SCNC: 145 MMOL/L (ref 135–145)
SP GR UR STRIP: 1.02 (ref 1–1.03)
TRIGL SERPL-MCNC: 100 MG/DL
TSH SERPL DL<=0.005 MIU/L-ACNC: 1.59 UIU/ML (ref 0.3–4.2)
UROBILINOGEN UR STRIP-ACNC: 0.2 E.U./DL
VIT D+METAB SERPL-MCNC: 51 NG/ML (ref 20–50)
WBC # BLD AUTO: 5.6 10E3/UL (ref 4–11)
WBC #/AREA URNS AUTO: NORMAL /HPF

## 2023-10-02 PROCEDURE — 36415 COLL VENOUS BLD VENIPUNCTURE: CPT | Performed by: INTERNAL MEDICINE

## 2023-10-02 PROCEDURE — 80061 LIPID PANEL: CPT | Performed by: INTERNAL MEDICINE

## 2023-10-02 PROCEDURE — 99396 PREV VISIT EST AGE 40-64: CPT | Mod: 25 | Performed by: INTERNAL MEDICINE

## 2023-10-02 PROCEDURE — 82306 VITAMIN D 25 HYDROXY: CPT | Performed by: INTERNAL MEDICINE

## 2023-10-02 PROCEDURE — 93010 ELECTROCARDIOGRAM REPORT: CPT | Performed by: INTERNAL MEDICINE

## 2023-10-02 PROCEDURE — 85025 COMPLETE CBC W/AUTO DIFF WBC: CPT | Performed by: INTERNAL MEDICINE

## 2023-10-02 PROCEDURE — 93005 ELECTROCARDIOGRAM TRACING: CPT | Performed by: INTERNAL MEDICINE

## 2023-10-02 PROCEDURE — 99214 OFFICE O/P EST MOD 30 MIN: CPT | Mod: 25 | Performed by: INTERNAL MEDICINE

## 2023-10-02 PROCEDURE — 81001 URINALYSIS AUTO W/SCOPE: CPT | Performed by: INTERNAL MEDICINE

## 2023-10-02 PROCEDURE — 80053 COMPREHEN METABOLIC PANEL: CPT | Performed by: INTERNAL MEDICINE

## 2023-10-02 PROCEDURE — 90471 IMMUNIZATION ADMIN: CPT | Performed by: INTERNAL MEDICINE

## 2023-10-02 PROCEDURE — 84443 ASSAY THYROID STIM HORMONE: CPT | Performed by: INTERNAL MEDICINE

## 2023-10-02 PROCEDURE — 90682 RIV4 VACC RECOMBINANT DNA IM: CPT | Performed by: INTERNAL MEDICINE

## 2023-10-02 ASSESSMENT — ENCOUNTER SYMPTOMS
HEMATOCHEZIA: 0
NAUSEA: 0
COUGH: 0
SORE THROAT: 0
DIARRHEA: 0
PARESTHESIAS: 0
DYSURIA: 0
CHILLS: 0
CONSTIPATION: 0
PALPITATIONS: 0
FREQUENCY: 0
HEADACHES: 0
FEVER: 0
BREAST MASS: 0
MYALGIAS: 0
SHORTNESS OF BREATH: 0
HEARTBURN: 0
JOINT SWELLING: 0
ARTHRALGIAS: 0
EYE PAIN: 0
DIZZINESS: 0
ABDOMINAL PAIN: 0
HEMATURIA: 0
WEAKNESS: 0
NERVOUS/ANXIOUS: 0

## 2023-10-02 NOTE — PROGRESS NOTES
SUBJECTIVE:   CC: Britney is an 57 year old who presents for preventive health visit.       10/2/2023     9:46 AM   Additional Questions   Roomed by garry   Accompanied by alone         10/2/2023     9:46 AM   Patient Reported Additional Medications   Patient reports taking the following new medications none     Britney is a 57-year-old female with history of hyperlipidemia and vitamin D deficiency.  She is currently here for a physical.    No concerns or complaints.  I previously saw her in the spring due to episode of hematuria and no evidence of UTI on her urine testing.  Since then she did see urology and had cystoscopy done which was normal.  CT scan showed no urologic abnormalities.  Since then she has not had any recurrent episodes.    She has been menopausal since the age of 50.  Currently no concerns or complaints, no significant hot flashes or sweats.  She does exercise regularly and does do resistant exercises, no chest pains palpitations or dizziness.    Healthy Habits:     Getting at least 3 servings of Calcium per day:  Yes    Bi-annual eye exam:  Yes    Dental care twice a year:  Yes    Sleep apnea or symptoms of sleep apnea:  None    Diet:  Regular (no restrictions)    Frequency of exercise:  6-7 days/week    Duration of exercise:  Greater than 60 minutes    Taking medications regularly:  Yes    Medication side effects:  None    Additional concerns today:  No      Social History     Tobacco Use    Smoking status: Never    Smokeless tobacco: Never   Substance Use Topics    Alcohol use: Not on file         9/26/2023     4:20 PM   Alcohol Use   Prescreen: >3 drinks/day or >7 drinks/week? No     Reviewed orders with patient.  Reviewed health maintenance and updated orders accordingly - Yes    Breast Cancer Screening:        3/15/2022     8:13 AM   Breast CA Risk Assessment (FHS-7)   Do you have a family history of breast, colon, or ovarian cancer? No / Unknown     Pertinent mammograms are reviewed under  the imaging tab.    History of abnormal Pap smear: NO - age 30-65 PAP every 5 years with negative HPV co-testing recommended      Latest Ref Rng & Units 10/1/2021     4:01 PM 10/17/2017     9:05 AM 10/15/2014     2:59 PM   PAP / HPV   PAP  Negative for Intraepithelial Lesion or Malignancy (NILM)  Negative for squamous intraepithelial lesion or malignancy  Electronically signed by Abby Mares CT (ASCP) on 10/23/2017 at  4:14 PM    Negative for squamous intraepithelial lesion or malignancy  Electronically signed by Chandrika Monge CT (ASCP) on 10/22/2014 at  3:25 PM      HPV 16 DNA Negative Negative      HPV 18 DNA Negative Negative      Other HR HPV Negative Negative        Reviewed and updated as needed this visit by clinical staff   Tobacco  Allergies  Meds              Reviewed and updated as needed this visit by Provider                   Review of Systems   Constitutional:  Negative for chills and fever.   HENT:  Negative for congestion, ear pain, hearing loss and sore throat.    Eyes:  Negative for pain and visual disturbance.   Respiratory:  Negative for cough and shortness of breath.    Cardiovascular:  Negative for chest pain, palpitations and peripheral edema.   Gastrointestinal:  Negative for abdominal pain, constipation, diarrhea, heartburn, hematochezia and nausea.   Breasts:  Negative for tenderness, breast mass and discharge.   Genitourinary:  Negative for dysuria, frequency, genital sores, hematuria, pelvic pain, urgency, vaginal bleeding and vaginal discharge.   Musculoskeletal:  Negative for arthralgias, joint swelling and myalgias.   Skin:  Negative for rash.   Neurological:  Negative for dizziness, weakness, headaches and paresthesias.   Psychiatric/Behavioral:  Negative for mood changes. The patient is not nervous/anxious.         OBJECTIVE:   /71 (BP Location: Left arm, Patient Position: Sitting, Cuff Size: Adult Regular)   Pulse 70   Temp 98.3  F (36.8  C) (Tympanic)    "Resp 16   Ht 1.575 m (5' 2\")   Wt 62.9 kg (138 lb 11.2 oz)   LMP  (LMP Unknown)   SpO2 98%   Breastfeeding No   BMI 25.37 kg/m    Physical Exam  General: well appearing female, alert and oriented x3  EYES: Eyelids, conjunctiva, and sclera were normal. Pupils were normal.   HEAD, EARS, NOSE, MOUTH, AND THROAT: no cervical LAD, no thyromegaly or nodules appreciated. TMs are visualized and normal, oropharynx is clear.  RESPIRATORY: respirations non labored, CTA bl, no wheezes, rales, no forced expiratory wheezing.  CARDIOVASCULAR: Heart rate and rhythm were normal my occasional extra beats are noted. No murmurs, rubs,gallops. There was no peripheral edema.   GASTROINTESTINAL: Positive bowel sounds, abdomen is soft, non tender, non distended.     MUSCULOSKELETAL: Muscle mass was normal for age. No joint synovitis or deformity.  LYMPHATIC: There were no enlarged nodes palpable.  SKIN/HAIR/NAILS: Skin color was normal.  No rashes.  NEUROLOGIC: The patient was alert and oriented.  Speech was normal.  There is no facial asymmetry.   PSYCHIATRIC:  Mood and affect were normal.   Breast exam: No axilla lymphadenopathy, breast masses or skin changes appreciated    ASSESSMENT/PLAN:   Britney was seen today for physical and recheck medication.    Diagnoses and all orders for this visit:    Annual physical exam  We will do fasting blood work today, flu shot was administered, she will get COVID-vaccine at the pharmacy.  She is due for mammogram later this year, she is up-to-date on Pap smear and colonoscopy.  -     Magnesium Glycinate 665 MG CAPS; Take 1 tablet by mouth daily  -     MA Screen Bilateral w/Huber; Future    Hyperlipidemia LDL goal <100  She is on Lipitor, will update labs today, co-Q10 supplement was recommended  -     Lipid panel reflex to direct LDL Fasting  -     Comprehensive metabolic panel  -     TSH with free T4 reflex    Vitamin D deficiency  -     Vitamin D Deficiency    Hematuria, unspecified type  She " "did see urology, cystoscopy was normal, CT scan was normal, she has not had any recurrent hematuria episodes of vaginal spotting or bleeding  -     UA with Microscopic reflex to Culture - lab collect  -     CBC with platelets and differential  -     UA Microscopic with Reflex to Culture    PVC (premature ventricular contraction)  -     EKG 12-lead, tracing only    Other orders  -     INFLUENZA VACCINE 18-64Y (FLUBLOK)        BMI:   Estimated body mass index is 25.37 kg/m  as calculated from the following:    Height as of this encounter: 1.575 m (5' 2\").    Weight as of this encounter: 62.9 kg (138 lb 11.2 oz).         She reports that she has never smoked. She has never used smokeless tobacco.    Margarita Knight MD  [facility]  "

## 2023-10-02 NOTE — NURSING NOTE
Prior to immunization administration, verified patients identity using patient s name and date of birth. Please see Immunization Activity for additional information.     Screening Questionnaire for Adult Immunization    Are you sick today?   No   Do you have allergies to medications, food, a vaccine component or latex?   No   Have you ever had a serious reaction after receiving a vaccination?   No   Do you have a long-term health problem with heart, lung, kidney, or metabolic disease (e.g., diabetes), asthma, a blood disorder, no spleen, complement component deficiency, a cochlear implant, or a spinal fluid leak?  Are you on long-term aspirin therapy?   No   Do you have cancer, leukemia, HIV/AIDS, or any other immune system problem?   No   Do you have a parent, brother, or sister with an immune system problem?   No   In the past 3 months, have you taken medications that affect  your immune system, such as prednisone, other steroids, or anticancer drugs; drugs for the treatment of rheumatoid arthritis, Crohn s disease, or psoriasis; or have you had radiation treatments?   No   Have you had a seizure, or a brain or other nervous system problem?   No   During the past year, have you received a transfusion of blood or blood    products, or been given immune (gamma) globulin or antiviral drug?   No   For women: Are you pregnant or is there a chance you could become       pregnant during the next month?   No   Have you received any vaccinations in the past 4 weeks?   No     Immunization questionnaire answers were all negative.      Pt tolerated injection. Site (Left deltoid) was cleansed with alcohol prior to injection (Influenza Vaccine 18 to 64yrs: FluBlok). No pain, burning, swelling or redness at the site of the injection. Patient instructed to remain in clinic for 15 minutes afterwards, and to report any adverse reactions    Screening performed by Aissatou Costello RN on 10/2/2023 at 11:00 AM.

## 2023-10-02 NOTE — PATIENT INSTRUCTIONS
Labs today  Flu shot today  Get covid vaccine  Schedule mammogram  For sleep: consider CBT treatment, or medications (ambien, lunesta, lorazepam). Meditation tapes, sound machines: green noise.   EKG today.

## 2023-10-03 LAB
ATRIAL RATE - MUSE: 64 BPM
DIASTOLIC BLOOD PRESSURE - MUSE: NORMAL MMHG
INTERPRETATION ECG - MUSE: NORMAL
P AXIS - MUSE: 66 DEGREES
PR INTERVAL - MUSE: 180 MS
QRS DURATION - MUSE: 82 MS
QT - MUSE: 408 MS
QTC - MUSE: 420 MS
R AXIS - MUSE: 22 DEGREES
SYSTOLIC BLOOD PRESSURE - MUSE: NORMAL MMHG
T AXIS - MUSE: 58 DEGREES
VENTRICULAR RATE- MUSE: 64 BPM

## 2024-01-08 ENCOUNTER — ANCILLARY PROCEDURE (OUTPATIENT)
Dept: MAMMOGRAPHY | Facility: CLINIC | Age: 59
End: 2024-01-08
Attending: INTERNAL MEDICINE
Payer: COMMERCIAL

## 2024-01-08 DIAGNOSIS — Z12.31 ENCOUNTER FOR SCREENING MAMMOGRAM FOR BREAST CANCER: ICD-10-CM

## 2024-01-08 PROCEDURE — 77063 BREAST TOMOSYNTHESIS BI: CPT

## 2024-02-04 ENCOUNTER — MYC MEDICAL ADVICE (OUTPATIENT)
Dept: INTERNAL MEDICINE | Facility: CLINIC | Age: 59
End: 2024-02-04
Payer: COMMERCIAL

## 2024-02-04 DIAGNOSIS — E78.00 HYPERCHOLESTEROLEMIA: ICD-10-CM

## 2024-02-05 RX ORDER — ATORVASTATIN CALCIUM 10 MG/1
10 TABLET, FILM COATED ORAL DAILY
Qty: 90 TABLET | Refills: 3 | Status: SHIPPED | OUTPATIENT
Start: 2024-02-05

## 2024-02-05 NOTE — TELEPHONE ENCOUNTER
Routing refill request to provider for review/approval because:  Allergy alert    Last Written Prescription Date:  12/16/2022  Last Fill Quantity: 90,  # refills: 3   Last office visit provider:  10/2/2023     Requested Prescriptions   Pending Prescriptions Disp Refills    atorvastatin (LIPITOR) 10 MG tablet 90 tablet 3     Sig: Take 1 tablet (10 mg) by mouth daily       Antihyperlipidemic agents Passed - 2/5/2024 12:22 PM        Passed - Lipid panel on file in past 12 mos     Recent Labs   Lab Test 10/02/23  1102   CHOL 204*   TRIG 100   HDL 82   *   NHDL 122               Passed - Normal serum ALT on record in past 12 mos     Recent Labs   Lab Test 10/02/23  1102   ALT 17             Passed - Recent (12 mo) or future (30 days) visit within the authorizing provider's specialty     The patient must have completed an in-person or virtual visit within the past 12 months or has a future visit scheduled within the next 90 days with the authorizing provider s specialty.  Urgent care and e-visits do not quality as an office visit for this protocol.          Passed - Medication is active on med list        Passed - Patient is age 18 years or older        Passed - No active pregnancy on record        Passed - No positive pregnancy test in past 12 mos             Audi Brandon RN 02/05/24 12:22 PM

## 2024-04-11 ENCOUNTER — TRANSFERRED RECORDS (OUTPATIENT)
Dept: HEALTH INFORMATION MANAGEMENT | Facility: CLINIC | Age: 59
End: 2024-04-11
Payer: COMMERCIAL

## 2024-04-29 ENCOUNTER — TRANSFERRED RECORDS (OUTPATIENT)
Dept: HEALTH INFORMATION MANAGEMENT | Facility: CLINIC | Age: 59
End: 2024-04-29
Payer: COMMERCIAL

## 2024-05-01 ENCOUNTER — TRANSFERRED RECORDS (OUTPATIENT)
Dept: HEALTH INFORMATION MANAGEMENT | Facility: CLINIC | Age: 59
End: 2024-05-01
Payer: COMMERCIAL

## 2024-05-31 ENCOUNTER — OFFICE VISIT (OUTPATIENT)
Dept: INTERNAL MEDICINE | Facility: CLINIC | Age: 59
End: 2024-05-31
Payer: COMMERCIAL

## 2024-05-31 VITALS
WEIGHT: 136.6 LBS | RESPIRATION RATE: 14 BRPM | TEMPERATURE: 97.1 F | HEIGHT: 62 IN | OXYGEN SATURATION: 97 % | DIASTOLIC BLOOD PRESSURE: 76 MMHG | SYSTOLIC BLOOD PRESSURE: 108 MMHG | HEART RATE: 75 BPM | BODY MASS INDEX: 25.14 KG/M2

## 2024-05-31 DIAGNOSIS — S83.512S RUPTURE OF ANTERIOR CRUCIATE LIGAMENT OF LEFT KNEE, SEQUELA: ICD-10-CM

## 2024-05-31 DIAGNOSIS — Z01.818 PRE-OP EXAM: Primary | ICD-10-CM

## 2024-05-31 LAB
BASOPHILS # BLD AUTO: 0.1 10E3/UL (ref 0–0.2)
BASOPHILS NFR BLD AUTO: 1 %
EOSINOPHIL # BLD AUTO: 0.2 10E3/UL (ref 0–0.7)
EOSINOPHIL NFR BLD AUTO: 3 %
ERYTHROCYTE [DISTWIDTH] IN BLOOD BY AUTOMATED COUNT: 11.7 % (ref 10–15)
HCT VFR BLD AUTO: 43.6 % (ref 35–47)
HGB BLD-MCNC: 14.2 G/DL (ref 11.7–15.7)
IMM GRANULOCYTES # BLD: 0 10E3/UL
IMM GRANULOCYTES NFR BLD: 0 %
LYMPHOCYTES # BLD AUTO: 2.6 10E3/UL (ref 0.8–5.3)
LYMPHOCYTES NFR BLD AUTO: 38 %
MCH RBC QN AUTO: 30 PG (ref 26.5–33)
MCHC RBC AUTO-ENTMCNC: 32.6 G/DL (ref 31.5–36.5)
MCV RBC AUTO: 92 FL (ref 78–100)
MONOCYTES # BLD AUTO: 0.4 10E3/UL (ref 0–1.3)
MONOCYTES NFR BLD AUTO: 6 %
NEUTROPHILS # BLD AUTO: 3.6 10E3/UL (ref 1.6–8.3)
NEUTROPHILS NFR BLD AUTO: 52 %
PLATELET # BLD AUTO: 314 10E3/UL (ref 150–450)
RBC # BLD AUTO: 4.73 10E6/UL (ref 3.8–5.2)
WBC # BLD AUTO: 6.9 10E3/UL (ref 4–11)

## 2024-05-31 PROCEDURE — 99213 OFFICE O/P EST LOW 20 MIN: CPT | Performed by: INTERNAL MEDICINE

## 2024-05-31 PROCEDURE — 80048 BASIC METABOLIC PNL TOTAL CA: CPT | Performed by: INTERNAL MEDICINE

## 2024-05-31 PROCEDURE — 85025 COMPLETE CBC W/AUTO DIFF WBC: CPT | Performed by: INTERNAL MEDICINE

## 2024-05-31 PROCEDURE — 36415 COLL VENOUS BLD VENIPUNCTURE: CPT | Performed by: INTERNAL MEDICINE

## 2024-05-31 RX ORDER — BIMATOPROST 3 UG/ML
SOLUTION TOPICAL
COMMUNITY
Start: 2024-04-29 | End: 2024-10-04

## 2024-05-31 RX ORDER — TRETINOIN 0.25 MG/G
CREAM TOPICAL
COMMUNITY

## 2024-05-31 ASSESSMENT — PAIN SCALES - GENERAL: PAINLEVEL: MILD PAIN (2)

## 2024-05-31 NOTE — PROGRESS NOTES
Preoperative Evaluation  [facility]  [address] [place] [address]  Phone: [phone]  Fax: [phone]  Primary Provider: Margarita Knight MD  Pre-op Performing Provider: Margarita Knight MD  May 31, 2024             5/28/2024   Surgical Information   What procedure is being done? Left knee ACL repair   [facility] [place]   Who is doing the procedure / surgery? Dr. Alfie Cabral   Date of surgery / procedure: June 17, 2024   Time of surgery / procedure: 2:00 pm   Where do you plan to recover after surgery? at home with family     Fax number for surgical facility: [phone]    Assessment & Plan     The proposed surgical procedure is considered LOW risk.    Pre-op exam  Patient is medically stable for this low risk surgery, will check basic labs today.  No history of hypertension, heart disease or diabetes.  -- Basic metabolic panel  (Ca, Cl, CO2, Creat, Gluc, K, Na, BUN)  - CBC with platelets and differential    Rupture of anterior cruciate ligament of left knee, sequela  - Basic metabolic panel  (Ca, Cl, CO2, Creat, Gluc, K, Na, BUN)  - CBC with platelets and differential      Recommendation  Approval given to proceed with proposed procedure, without further diagnostic evaluation.    Marija Tan is a 58 year old, presenting for the following:  Pre-Op Exam (Surgical Information/What procedure is being done? Left knee ACL repair/Fac[facility] or [facility] [place]/Who is doing the procedure / surgery? Dr. Alfie Cabral/Date of surgery / procedure: June 17, 2024/Time of surgery / procedure: 2:00 pm/Where do you plan to recover after surgery? at home with family//Fax number for surgical facility: [phone])          5/31/2024     9:46 AM   Additional Questions   Roomed by  LINDSAY Carr     HPI related to upcoming procedure:     Britney is a 57-year-old female with history of hyperlipidemia and vitamin D deficiency.  She is currently here for preop for left ACL surgery.    She developed ACL tear and instability in her knee in the spring, she continues to have symptoms despite conservative management and steroid injections.  Currently she is looking forward to the repair surgery.    Previous surgical history significant for rotator cuff repair, breast reduction surgery in the past, she denies any problems with anesthesia or sedation, no bleeding or clotting issues, no family history of such.    No history of heart disease, prior to her knee injury she has been very active playing [employer] and currently has been doing a lot of walking and strength training.  No chest pains palpitations shortness of breath or change in exercise tolerance.    No history of smoking asthma or snoring.  She did have sinus infection a week ago and currently symptoms have resolved after completion of antibiotic, she denies any fevers or chills.    She has been menopausal for several years now.        5/28/2024   Pre-Op Questionnaire   Have you ever had a heart attack or stroke? No   Have you ever had surgery on your heart or blood vessels, such as a stent placement, a coronary artery bypass, or surgery on an artery in your head, neck, heart, or legs? No   Do you have chest pain with activity? No   Do you have a history of heart failure? No   Do you currently have a cold, bronchitis or symptoms of other infection? No   Do you have a cough, shortness of breath, or wheezing? No   Do you or anyone in your family have previous history of blood clots? No   Do you or does anyone in your family have a serious bleeding problem such as prolonged bleeding following surgeries or cuts? No   Have you ever had problems with anemia or been told to take iron pills? No   Have you had any abnormal blood loss such as black, tarry or  bloody stools, or abnormal vaginal bleeding? No   Have you ever had a blood transfusion? No   Are you willing to have a blood transfusion if it is medically needed before, during, or after your surgery? Yes   Have you or any of your relatives ever had problems with anesthesia? No   Do you have sleep apnea, excessive snoring or daytime drowsiness? No   Do you have any artifical heart valves or other implanted medical devices like a pacemaker, defibrillator, or continuous glucose monitor? No   Do you have artificial joints? No   Are you allergic to latex? No     Patient Active Problem List    Diagnosis Date Noted    Hypercholesterolemia      Priority: Medium     Created by Conversion        Sinus Tachycardia      Priority: Medium     Created by Conversion          Past Medical History:   Diagnosis Date    Breast cyst 01/01/2010    Chest pain      Past Surgical History:   Procedure Laterality Date    BREAST SURGERY  April 5, 2022    Bilateral Breast Reduction    COLONOSCOPY  January 2015    ORTHOPEDIC SURGERY  October 18, 2021    Left shoulder rotator cuff repair     Current Outpatient Medications   Medication Sig Dispense Refill    atorvastatin (LIPITOR) 10 MG tablet Take 1 tablet (10 mg) by mouth daily 90 tablet 3    bimatoprost (LATISSE) 0.03 % external opthalmic solution APPLY 1 APPLICATION TOPICALLY TO EYELASH LINE DAILY      calcium citrate-vitamin D (CITRACAL+D) 315-200 mg-unit per tablet [CALCIUM CITRATE-VITAMIN D (CITRACAL+D) 315-200 MG-UNIT PER TABLET] Take 1 tablet by mouth 2 (two) times a day.      Magnesium Glycinate 665 MG CAPS Take 1 tablet by mouth daily      multivitamin capsule [MULTIVITAMIN CAPSULE] Take 1 capsule by mouth daily.      omega-3 fatty acids-fish oil (FISH OIL) 360-1,200 mg cap [OMEGA-3 FATTY ACIDS-FISH OIL (FISH OIL) 360-1,200 MG CAP] Take 1 capsule by mouth daily.      tretinoin (RETIN-A) 0.025 % external cream APPLY A pea sized amount TO entire face BY TOPICAL ROUTE after washing -  "Avoid eye area         Allergies   Allergen Reactions    Simvastatin Unknown        Social History     Tobacco Use    Smoking status: Never    Smokeless tobacco: Never   Substance Use Topics    Alcohol use: Yes     Comment: Socially - 1 a week     Family History   Problem Relation Age of Onset    Lung Cancer Father     Hyperlipidemia Mother     Hypertension Mother     Anxiety Disorder Mother     Graves' disease Sister     Attention Deficit Disorder Daughter     Depression Daughter     Hypertension Sister     Thyroid Disease Sister     Hypertension Sister     Hyperlipidemia Sister      History   Drug Use Unknown           Review of systems: As above, no abdominal pain constipation diarrhea, no reduced to regular frequency    Objective    /76 (BP Location: Left arm, Patient Position: Sitting, Cuff Size: Adult Regular)   Pulse 75   Temp 97.1  F (36.2  C) (Tympanic)   Resp 14   Ht 1.575 m (5' 2\")   Wt 62 kg (136 lb 9.6 oz)   LMP  (LMP Unknown)   SpO2 97%   BMI 24.98 kg/m     Estimated body mass index is 24.98 kg/m  as calculated from the following:    Height as of this encounter: 1.575 m (5' 2\").    Weight as of this encounter: 62 kg (136 lb 9.6 oz).  Physical Exam  General: well appearing female, alert and oriented x3  EYES: Eyelids, conjunctiva, and sclera were normal. Pupils were normal.   HEAD, EARS, NOSE, MOUTH, AND THROAT: no cervical LAD, no thyromegaly or nodules appreciated. TMs are visualized and normal, oropharynx is clear.  RESPIRATORY: respirations non labored, CTA bl, no wheezes, rales, no forced expiratory wheezing.  CARDIOVASCULAR: Heart rate and rhythm were normal. No murmurs, rubs,gallops. There was no peripheral edema. No carotid bruits.  GASTROINTESTINAL: Positive bowel sounds, abdomen is soft, non tender, non distended.     MUSCULOSKELETAL: Muscle mass was normal for age. No joint synovitis or deformity.  LYMPHATIC: There were no enlarged nodes palpable.  SKIN/HAIR/NAILS: Skin color " was normal.  No rashes.  NEUROLOGIC: The patient was alert and oriented.  Speech was normal.  There is no facial asymmetry.   PSYCHIATRIC:  Mood and affect were normal.        Recent Labs   Lab Test 10/02/23  1102   HGB 13.7         POTASSIUM 5.0   CR 0.78        Diagnostics  Labs pending at this time.  Results will be reviewed when available.   No EKG required, no history of coronary heart disease, significant arrhythmia, peripheral arterial disease or other structural heart disease.    Revised Cardiac Risk Index (RCRI)  The patient has the following serious cardiovascular risks for perioperative complications:   - No serious cardiac risks = 0 points     RCRI Interpretation: 0 points: Class I (very low risk - 0.4% complication rate)         Signed Electronically by: Margarita Knight MD  Copy of this evaluation report is provided to requesting physician.

## 2024-06-01 LAB
ANION GAP SERPL CALCULATED.3IONS-SCNC: 14 MMOL/L (ref 7–15)
BUN SERPL-MCNC: 15.6 MG/DL (ref 8–23)
CALCIUM SERPL-MCNC: 9.7 MG/DL (ref 8.6–10)
CHLORIDE SERPL-SCNC: 103 MMOL/L (ref 98–107)
CREAT SERPL-MCNC: 0.82 MG/DL (ref 0.51–0.95)
DEPRECATED HCO3 PLAS-SCNC: 24 MMOL/L (ref 22–29)
EGFRCR SERPLBLD CKD-EPI 2021: 82 ML/MIN/1.73M2
GLUCOSE SERPL-MCNC: 101 MG/DL (ref 70–99)
POTASSIUM SERPL-SCNC: 4.8 MMOL/L (ref 3.4–5.3)
SODIUM SERPL-SCNC: 141 MMOL/L (ref 135–145)

## 2024-06-03 ENCOUNTER — TELEPHONE (OUTPATIENT)
Dept: INTERNAL MEDICINE | Facility: CLINIC | Age: 59
End: 2024-06-03
Payer: COMMERCIAL

## 2024-06-03 NOTE — TELEPHONE ENCOUNTER
Luz 3, 2024    Patient's pre-operative physical documentation and labs have been completed by Dr. Knight.  The pre-operative paperwork was faxed to [facility] at [phone] per instructions from the surgeon.    Sia Galan

## 2024-06-17 ENCOUNTER — TRANSFERRED RECORDS (OUTPATIENT)
Dept: HEALTH INFORMATION MANAGEMENT | Facility: CLINIC | Age: 59
End: 2024-06-17
Payer: COMMERCIAL

## 2024-07-01 ENCOUNTER — TRANSFERRED RECORDS (OUTPATIENT)
Dept: HEALTH INFORMATION MANAGEMENT | Facility: CLINIC | Age: 59
End: 2024-07-01
Payer: COMMERCIAL

## 2024-07-29 ENCOUNTER — TRANSFERRED RECORDS (OUTPATIENT)
Dept: HEALTH INFORMATION MANAGEMENT | Facility: CLINIC | Age: 59
End: 2024-07-29
Payer: COMMERCIAL

## 2024-09-10 ENCOUNTER — TRANSFERRED RECORDS (OUTPATIENT)
Dept: HEALTH INFORMATION MANAGEMENT | Facility: CLINIC | Age: 59
End: 2024-09-10
Payer: COMMERCIAL

## 2024-10-03 SDOH — HEALTH STABILITY: PHYSICAL HEALTH: ON AVERAGE, HOW MANY MINUTES DO YOU ENGAGE IN EXERCISE AT THIS LEVEL?: 20 MIN

## 2024-10-03 SDOH — HEALTH STABILITY: PHYSICAL HEALTH: ON AVERAGE, HOW MANY DAYS PER WEEK DO YOU ENGAGE IN MODERATE TO STRENUOUS EXERCISE (LIKE A BRISK WALK)?: 0 DAYS

## 2024-10-03 ASSESSMENT — SOCIAL DETERMINANTS OF HEALTH (SDOH): HOW OFTEN DO YOU GET TOGETHER WITH FRIENDS OR RELATIVES?: MORE THAN THREE TIMES A WEEK

## 2024-10-04 ENCOUNTER — OFFICE VISIT (OUTPATIENT)
Dept: INTERNAL MEDICINE | Facility: CLINIC | Age: 59
End: 2024-10-04
Payer: COMMERCIAL

## 2024-10-04 VITALS
WEIGHT: 138.9 LBS | DIASTOLIC BLOOD PRESSURE: 85 MMHG | HEIGHT: 62 IN | BODY MASS INDEX: 25.56 KG/M2 | OXYGEN SATURATION: 99 % | HEART RATE: 81 BPM | RESPIRATION RATE: 18 BRPM | TEMPERATURE: 97.6 F | SYSTOLIC BLOOD PRESSURE: 142 MMHG

## 2024-10-04 DIAGNOSIS — E55.9 VITAMIN D DEFICIENCY: ICD-10-CM

## 2024-10-04 DIAGNOSIS — Z91.018 FOOD ALLERGY: ICD-10-CM

## 2024-10-04 DIAGNOSIS — Z00.00 ANNUAL PHYSICAL EXAM: Primary | ICD-10-CM

## 2024-10-04 DIAGNOSIS — Z12.31 ENCOUNTER FOR SCREENING MAMMOGRAM FOR BREAST CANCER: ICD-10-CM

## 2024-10-04 DIAGNOSIS — R03.0 ELEVATED BP WITHOUT DIAGNOSIS OF HYPERTENSION: ICD-10-CM

## 2024-10-04 DIAGNOSIS — E78.00 HYPERCHOLESTEROLEMIA: ICD-10-CM

## 2024-10-04 DIAGNOSIS — Z12.11 COLON CANCER SCREENING: ICD-10-CM

## 2024-10-04 LAB
BASOPHILS # BLD AUTO: 0 10E3/UL (ref 0–0.2)
BASOPHILS NFR BLD AUTO: 0 %
EOSINOPHIL # BLD AUTO: 0.2 10E3/UL (ref 0–0.7)
EOSINOPHIL NFR BLD AUTO: 4 %
ERYTHROCYTE [DISTWIDTH] IN BLOOD BY AUTOMATED COUNT: 12.3 % (ref 10–15)
HCT VFR BLD AUTO: 44.3 % (ref 35–47)
HGB BLD-MCNC: 14.5 G/DL (ref 11.7–15.7)
IMM GRANULOCYTES # BLD: 0 10E3/UL
IMM GRANULOCYTES NFR BLD: 0 %
LYMPHOCYTES # BLD AUTO: 2.1 10E3/UL (ref 0.8–5.3)
LYMPHOCYTES NFR BLD AUTO: 40 %
MCH RBC QN AUTO: 29.7 PG (ref 26.5–33)
MCHC RBC AUTO-ENTMCNC: 32.7 G/DL (ref 31.5–36.5)
MCV RBC AUTO: 91 FL (ref 78–100)
MONOCYTES # BLD AUTO: 0.4 10E3/UL (ref 0–1.3)
MONOCYTES NFR BLD AUTO: 7 %
NEUTROPHILS # BLD AUTO: 2.5 10E3/UL (ref 1.6–8.3)
NEUTROPHILS NFR BLD AUTO: 49 %
PLATELET # BLD AUTO: 231 10E3/UL (ref 150–450)
RBC # BLD AUTO: 4.89 10E6/UL (ref 3.8–5.2)
WBC # BLD AUTO: 5.2 10E3/UL (ref 4–11)

## 2024-10-04 PROCEDURE — 80053 COMPREHEN METABOLIC PANEL: CPT | Performed by: INTERNAL MEDICINE

## 2024-10-04 PROCEDURE — 82306 VITAMIN D 25 HYDROXY: CPT | Performed by: INTERNAL MEDICINE

## 2024-10-04 PROCEDURE — 86008 ALLG SPEC IGE RECOMB EA: CPT | Performed by: INTERNAL MEDICINE

## 2024-10-04 PROCEDURE — 85025 COMPLETE CBC W/AUTO DIFF WBC: CPT | Performed by: INTERNAL MEDICINE

## 2024-10-04 PROCEDURE — 36415 COLL VENOUS BLD VENIPUNCTURE: CPT | Performed by: INTERNAL MEDICINE

## 2024-10-04 PROCEDURE — 84443 ASSAY THYROID STIM HORMONE: CPT | Performed by: INTERNAL MEDICINE

## 2024-10-04 PROCEDURE — 80061 LIPID PANEL: CPT | Performed by: INTERNAL MEDICINE

## 2024-10-04 PROCEDURE — 99396 PREV VISIT EST AGE 40-64: CPT | Performed by: INTERNAL MEDICINE

## 2024-10-04 RX ORDER — AMPICILLIN TRIHYDRATE 250 MG
CAPSULE ORAL
COMMUNITY
Start: 2023-10-05

## 2024-10-04 ASSESSMENT — PAIN SCALES - GENERAL: PAINLEVEL: MODERATE PAIN (4)

## 2024-10-04 NOTE — PROGRESS NOTES
"Preventive Care Visit  [facility]  Margarita Knight MD, Internal Medicine  Oct 4, 2024      Assessment & Plan     Annual physical exam  Will do fasting labs today, she will be due for mammogram and colonoscopy early next year.  Discussed adequate calcium intake, vitamin supplements were reviewed, she will continue on Caltrate 600 mg and D once a day and 2 portions of dairy a day.  CoQ 10 supplements.    - Colonoscopy Screening  Referral; Future  - MA Screen Bilateral w/Huber; Future    Hypercholesterolemia  Has been on Lipitor 10 mg daily  - Lipid panel reflex to direct LDL Non-fasting  - Comprehensive metabolic panel  - CBC with platelets and differential  - TSH with free T4 reflex    Elevated BP without diagnosis of hypertension  Suspect that blood pressure is elevated due to ibuprofen, prior to surgery her blood pressure has in the very good range.  For now expectation is that her knee will continue to recover and pain should resolve in the next 6 months and she should be able to come off ibuprofen, will monitor for now.  Discussed to utilize Tylenol in addition to ibuprofen to help tapered dose, will follow-up in 6 months to see where blood pressure reading side.  If at home it is consistently in 150s and 160s she will let me know.  - Comprehensive metabolic panel  - CBC with platelets and differential  - TSH with free T4 reflex    Vitamin D deficiency  - Vitamin D Deficiency    Food allergy  Recurrent nausea and vomiting after eating fried of scrambled eggs, will see if she is allergic to certain component of the egg.  - Egg Components Allergy Panel      BMI  Estimated body mass index is 25.41 kg/m  as calculated from the following:    Height as of this encounter: 1.575 m (5' 2\").    Weight as of this encounter: 63 kg (138 lb 14.4 oz).       Counseling  Appropriate preventive services were addressed with this patient via screening, questionnaire, or discussion as appropriate for " fall prevention, nutrition, physical activity, Tobacco-use cessation, social engagement, weight loss and cognition.  Checklist reviewing preventive services available has been given to the patient.  Reviewed patient's diet, addressing concerns and/or questions.   She is at risk for psychosocial distress and has been provided with information to reduce risk.       Marija Tan is a 58 year old, presenting for the following:  Physical (Patient reports she is here for annual physical. ) and Recheck Medication        10/4/2024     9:42 AM   Additional Questions   Roomed by Gwen   Accompanied by alone         10/4/2024     9:42 AM   Patient Reported Additional Medications   Patient reports taking the following new medications none        Health Care Directive  Patient does not have a Health Care Directive or Living Will: Advance Directive received and scanned. Click on Code in the patient header to view.    DANIEL Tan is a 58-year-old female with history of hyperlipidemia and vitamin D deficiency.  She is currently here for a physical.    She had complex ACL and meniscus repair surgery in July of this year and is still recovering.  Due to ongoing stiffness and pain in her knee she has been taking ibuprofen 600 mg twice a day and blood pressure has been higher than her usual in 140s, normally she is in 1 teens.  She drinks alcohol rarely and does not eat excessive salt.  Due to orthopedic surgery her activity level has diminished.  She denies depression or anxiety.    She also noticed new symptoms of nausea and vomiting when she eats straight eggs.  If eggs are used in the recipe she does not develop reaction but if she does fried of scrambled eggs it does cause nausea and vomiting.        10/3/2024   General Health   How would you rate your overall physical health? Good   Feel stress (tense, anxious, or unable to sleep) Only a little      (!) STRESS CONCERN      10/3/2024   Nutrition   Three or more servings of  calcium each day? Yes   Diet: Regular (no restrictions)   How many servings of fruit and vegetables per day? 4 or more   How many sweetened beverages each day? 0-1            10/3/2024   Exercise   Days per week of moderate/strenous exercise 0 days   Average minutes spent exercising at this level 20 min      (!) EXERCISE CONCERN      10/3/2024   Social Factors   Frequency of gathering with friends or relatives More than three times a week   Worry food won't last until get money to buy more No   Food not last or not have enough money for food? No   Do you have housing? (Housing is defined as stable permanent housing and does not include staying ouside in a car, in a tent, in an abandoned building, in an overnight shelter, or couch-surfing.) Yes   Are you worried about losing your housing? No   Lack of transportation? No   Unable to get utilities (heat,electricity)? No            10/3/2024   Fall Risk   Fallen 2 or more times in the past year? No   Trouble with walking or balance? No             10/3/2024   Dental   Dentist two times every year? Yes            10/3/2024   TB Screening   Were you born outside of the [place]? No            10/3/2024   Substance Use   Alcohol more than 3/day or more than 7/wk No   Do you use any other substances recreationally? No        Social History     Tobacco Use    Smoking status: Never    Smokeless tobacco: Never   Vaping Use    Vaping status: Never Used   Substance Use Topics    Alcohol use: Yes     Comment: Socially - 1 a week    Drug use: Never           1/8/2024   LAST FHS-7 RESULTS   1st degree relative breast or ovarian cancer No   Any relative bilateral breast cancer No   Any male have breast cancer No   Any ONE woman have BOTH breast AND ovarian cancer No   Any woman with breast cancer before 50yrs No   2 or more relatives with breast AND/OR ovarian cancer No   2 or more relatives with breast AND/OR bowel cancer No           10/3/2024   STI Screening   New sexual partner(s)  "since last STI/HIV test? No        History of abnormal Pap smear:         Latest Ref Rng & Units 10/1/2021     4:01 PM 10/17/2017     9:05 AM 10/15/2014     2:59 PM   PAP / HPV   PAP  Negative for Intraepithelial Lesion or Malignancy (NILM)  Negative for squamous intraepithelial lesion or malignancy  Electronically signed by Abby Mares CT (ASCP) on 10/23/2017 at  4:14 PM    Negative for squamous intraepithelial lesion or malignancy  Electronically signed by Chnadrika Monge CT (ASCP) on 10/22/2014 at  3:25 PM      HPV 16 DNA Negative Negative      HPV 18 DNA Negative Negative      Other HR HPV Negative Negative        ASCVD Risk   The 10-year ASCVD risk score (Angelica SANTANA, et al., 2019) is: 2.9%    Values used to calculate the score:      Age: 58 years      Sex: Female      Is Non- : No      Diabetic: No      Tobacco smoker: No      Systolic Blood Pressure: 146 mmHg      Is BP treated: No      HDL Cholesterol: 82 mg/dL      Total Cholesterol: 204 mg/dL      Reviewed and updated as needed this visit by Provider                    Review of systems: As above, no chest pains palpitations or shortness of breath, no abdominal pain constipation diarrhea or blood in the stool.  No vaginal spotting bleeding or urinary symptoms.  No hearing or vision problems, she does check eye exam yearly, she does see dermatologist once a year     Objective    Exam  BP (!) 142/85   Pulse 81   Temp 97.6  F (36.4  C) (Tympanic)   Resp 18   Ht 1.575 m (5' 2\")   Wt 63 kg (138 lb 14.4 oz)   LMP  (LMP Unknown)   SpO2 99%   Breastfeeding No   BMI 25.41 kg/m     Estimated body mass index is 25.41 kg/m  as calculated from the following:    Height as of this encounter: 1.575 m (5' 2\").    Weight as of this encounter: 63 kg (138 lb 14.4 oz).    Physical Exam  General: well appearing female, alert and oriented x3  EYES: Eyelids, conjunctiva, and sclera were normal. Pupils were normal.   HEAD, EARS, " NOSE, MOUTH, AND THROAT: no cervical LAD, no thyromegaly or nodules appreciated. TMs are visualized and normal, oropharynx is clear.  RESPIRATORY: respirations non labored, CTA bl, no wheezes, rales, no forced expiratory wheezing.  CARDIOVASCULAR: Heart rate and rhythm were normal. No murmurs, rubs,gallops. There was no peripheral edema. No carotid bruits.  GASTROINTESTINAL: Positive bowel sounds, abdomen is soft, non tender, non distended.     MUSCULOSKELETAL: Muscle mass was normal for age. No joint synovitis or deformity.  LYMPHATIC: There were no enlarged nodes palpable.  SKIN/HAIR/NAILS: Skin color was normal.  No rashes.  NEUROLOGIC: The patient was alert and oriented.  Speech was normal.  There is no facial asymmetry.   PSYCHIATRIC:  Mood and affect were normal.   Breast exam: No axilla lymphadenopathy, breast masses or skin changes appreciated.        Signed Electronically by: Margarita Knight MD

## 2024-10-04 NOTE — PATIENT INSTRUCTIONS
Ibuprofen can increase b/p, can try adding Tylenol 1000 mg twice a day to a lower dose of Ibuprofen. Maximum Tylenol is 4000 mg a day. Voltaren gel topically can be use.     2. Labs today    3. Monitor b/p at home, long term, goal b/p <130/80, try to cut back on Ibuorifen to help lower b/p. If b/p at home 150-160 consistently, let me know, Follow up in 6 months.     4. Colonoscopy and mammogram in January.

## 2024-10-05 LAB
ALBUMIN SERPL BCG-MCNC: 4.6 G/DL (ref 3.5–5.2)
ALP SERPL-CCNC: 67 U/L (ref 40–150)
ALT SERPL W P-5'-P-CCNC: 18 U/L (ref 0–50)
ANION GAP SERPL CALCULATED.3IONS-SCNC: 15 MMOL/L (ref 7–15)
AST SERPL W P-5'-P-CCNC: 22 U/L (ref 0–45)
BILIRUB SERPL-MCNC: 0.5 MG/DL
BUN SERPL-MCNC: 15.3 MG/DL (ref 8–23)
CALCIUM SERPL-MCNC: 9.6 MG/DL (ref 8.8–10.4)
CHLORIDE SERPL-SCNC: 104 MMOL/L (ref 98–107)
CHOLEST SERPL-MCNC: 237 MG/DL
CREAT SERPL-MCNC: 0.75 MG/DL (ref 0.51–0.95)
EGFRCR SERPLBLD CKD-EPI 2021: >90 ML/MIN/1.73M2
FASTING STATUS PATIENT QL REPORTED: YES
FASTING STATUS PATIENT QL REPORTED: YES
GLUCOSE SERPL-MCNC: 94 MG/DL (ref 70–99)
HCO3 SERPL-SCNC: 24 MMOL/L (ref 22–29)
HDLC SERPL-MCNC: 88 MG/DL
LDLC SERPL CALC-MCNC: 121 MG/DL
NONHDLC SERPL-MCNC: 149 MG/DL
POTASSIUM SERPL-SCNC: 4.4 MMOL/L (ref 3.4–5.3)
PROT SERPL-MCNC: 7.3 G/DL (ref 6.4–8.3)
SODIUM SERPL-SCNC: 143 MMOL/L (ref 135–145)
TRIGL SERPL-MCNC: 141 MG/DL
TSH SERPL DL<=0.005 MIU/L-ACNC: 1.61 UIU/ML (ref 0.3–4.2)
VIT D+METAB SERPL-MCNC: 45 NG/ML (ref 20–50)

## 2024-10-07 LAB
OVALB IGE QN: <0.1 KU(A)/L
OVOMUCOID IGE QN: <0.1 KU(A)/L

## 2024-10-21 ENCOUNTER — TRANSFERRED RECORDS (OUTPATIENT)
Dept: HEALTH INFORMATION MANAGEMENT | Facility: CLINIC | Age: 59
End: 2024-10-21
Payer: COMMERCIAL

## 2024-12-16 ENCOUNTER — TRANSFERRED RECORDS (OUTPATIENT)
Dept: HEALTH INFORMATION MANAGEMENT | Facility: CLINIC | Age: 59
End: 2024-12-16
Payer: COMMERCIAL

## 2025-01-09 ENCOUNTER — ANCILLARY PROCEDURE (OUTPATIENT)
Dept: MAMMOGRAPHY | Facility: CLINIC | Age: 60
End: 2025-01-09
Attending: INTERNAL MEDICINE
Payer: COMMERCIAL

## 2025-01-09 DIAGNOSIS — Z12.31 ENCOUNTER FOR SCREENING MAMMOGRAM FOR BREAST CANCER: ICD-10-CM

## 2025-01-09 PROCEDURE — 77063 BREAST TOMOSYNTHESIS BI: CPT

## 2025-01-09 PROCEDURE — 77067 SCR MAMMO BI INCL CAD: CPT

## 2025-01-17 PROBLEM — D12.6 ADENOMATOUS COLON POLYP: Status: ACTIVE | Noted: 2025-01-17

## 2025-01-20 ENCOUNTER — PATIENT OUTREACH (OUTPATIENT)
Dept: GASTROENTEROLOGY | Facility: CLINIC | Age: 60
End: 2025-01-20
Payer: COMMERCIAL

## 2025-01-23 DIAGNOSIS — E78.00 HYPERCHOLESTEROLEMIA: ICD-10-CM

## 2025-01-23 RX ORDER — ATORVASTATIN CALCIUM 10 MG/1
10 TABLET, FILM COATED ORAL DAILY
Qty: 90 TABLET | Refills: 1 | Status: SHIPPED | OUTPATIENT
Start: 2025-01-23

## 2025-04-10 ENCOUNTER — OFFICE VISIT (OUTPATIENT)
Dept: INTERNAL MEDICINE | Facility: CLINIC | Age: 60
End: 2025-04-10
Payer: COMMERCIAL

## 2025-04-10 VITALS
SYSTOLIC BLOOD PRESSURE: 116 MMHG | OXYGEN SATURATION: 99 % | DIASTOLIC BLOOD PRESSURE: 76 MMHG | TEMPERATURE: 98.2 F | HEIGHT: 62 IN | RESPIRATION RATE: 16 BRPM | BODY MASS INDEX: 25.82 KG/M2 | WEIGHT: 140.3 LBS | HEART RATE: 86 BPM

## 2025-04-10 DIAGNOSIS — E78.00 HYPERCHOLESTEROLEMIA: ICD-10-CM

## 2025-04-10 DIAGNOSIS — R03.0 ELEVATED BP WITHOUT DIAGNOSIS OF HYPERTENSION: Primary | ICD-10-CM

## 2025-04-10 LAB
CHOLEST SERPL-MCNC: 206 MG/DL
FASTING STATUS PATIENT QL REPORTED: YES
HDLC SERPL-MCNC: 81 MG/DL
LDLC SERPL CALC-MCNC: 108 MG/DL
NONHDLC SERPL-MCNC: 125 MG/DL
TRIGL SERPL-MCNC: 84 MG/DL

## 2025-04-10 RX ORDER — ATORVASTATIN CALCIUM 10 MG/1
10 TABLET, FILM COATED ORAL DAILY
Qty: 90 TABLET | Refills: 3 | Status: SHIPPED | OUTPATIENT
Start: 2025-04-10

## 2025-04-10 ASSESSMENT — PAIN SCALES - GENERAL: PAINLEVEL_OUTOF10: NO PAIN (0)

## 2025-04-10 NOTE — NURSING NOTE
Prior to immunization administration, verified patients identity using patient s name and date of birth. Please see Immunization Activity for additional information.     Screening Questionnaire for Adult Immunization    Are you sick today?   No   Do you have allergies to medications, food, a vaccine component or latex?   No   Have you ever had a serious reaction after receiving a vaccination?   No   Do you have a long-term health problem with heart, lung, kidney, or metabolic disease (e.g., diabetes), asthma, a blood disorder, no spleen, complement component deficiency, a cochlear implant, or a spinal fluid leak?  Are you on long-term aspirin therapy?   No   Do you have cancer, leukemia, HIV/AIDS, or any other immune system problem?   No   Do you have a parent, brother, or sister with an immune system problem?   No   In the past 3 months, have you taken medications that affect  your immune system, such as prednisone, other steroids, or anticancer drugs; drugs for the treatment of rheumatoid arthritis, Crohn s disease, or psoriasis; or have you had radiation treatments?   No   Have you had a seizure, or a brain or other nervous system problem?   No   During the past year, have you received a transfusion of blood or blood    products, or been given immune (gamma) globulin or antiviral drug?   No   For women: Are you pregnant or is there a chance you could become       pregnant during the next month?   No   Have you received any vaccinations in the past 4 weeks?   No     Immunization questionnaire answers were all negative.      Pt tolerated injection (Prevnar 20). Site (left deltoid) was cleansed with alcohol prior to injections. No pain, burning, swelling or redness at the site of the injection. Patient instructed to remain in clinic for 15 minutes afterwards, and to report any adverse reactions    Screening performed by Aissatou Costello RN on 4/10/2025 at 12:03 PM.

## 2025-04-10 NOTE — PROGRESS NOTES
"  Assessment & Plan     Elevated BP without diagnosis of hypertension  Single episode precipitated by surgery and ibuprofen use, currently resolved, no evidence of hypertension in the office or while monitoring at home.  She was congratulated.    Hypercholesterolemia  She has been on Lipitor small dose for a long time and LDL levels have fluctuated but have been above goal of 100.  Will repeat LDL again today, if it is still a little bit elevated she wants to continue lifestyle changes and would consider increasing Lipitor when she sees me again in the fall for his physical if it still above goal.  - atorvastatin (LIPITOR) 10 MG tablet; Take 1 tablet (10 mg) by mouth daily.  - Lipid panel reflex to direct LDL Fasting      BMI  Estimated body mass index is 25.65 kg/m  as calculated from the following:    Height as of this encounter: 1.575 m (5' 2.01\").    Weight as of this encounter: 63.6 kg (140 lb 4.8 oz).       Subjective   Britney is a 59 year old, presenting for the following health issues:  Blood Pressure Check (Pt would like to discuss BP and has a log from home. ) and Recheck Medication (Refill pended for review. )    History of Present Illness       Hypertension: She presents for follow up of hypertension.  She does check blood pressure  regularly outside of the clinic. Outpatient blood pressures have not been over 140/90. She does not follow a low salt diet.     She eats 2-3 servings of fruits and vegetables daily.She consumes 0 sweetened beverage(s) daily.She exercises with enough effort to increase her heart rate 20 to 29 minutes per day.  She exercises with enough effort to increase her heart rate 5 days per week.   She is taking medications regularly.      Britney is a 59-year-old female with history of hyperlipidemia and vitamin D deficiency.  She is currently here to follow-up on elevated blood pressure.    I saw her for a physical in October, at that time she was postop from her ACL repair and blood " "pressure was slightly elevated.  She was taking ibuprofen on a regular basis.  Since then her left knee rehabilitation is almost back to normal and she is off ibuprofen, blood pressure in the office and at home has been excellent.    He does have hyperlipidemia and currently is on Lipitor.  LDL in the past have been in 120s, she wants to repeat it again today, and she plans to increase her exercise over the summer and would consider increasing Lipitor dose in the fall if it still above goal when I see her for physical next.    Review of systems: As above        Objective    /76 (BP Location: Left arm, Patient Position: Sitting, Cuff Size: Adult Regular)   Pulse 86   Temp 98.2  F (36.8  C) (Temporal)   Resp 16   Ht 1.575 m (5' 2.01\")   Wt 63.6 kg (140 lb 4.8 oz)   LMP  (LMP Unknown)   SpO2 99%   BMI 25.65 kg/m    Body mass index is 25.65 kg/m .  Physical Exam   General: well appearing female, alert and oriented x3  EYES: Eyelids, conjunctiva, and sclera were normal. Pupils were normal.   HEAD, EARS, NOSE, MOUTH, AND THROAT: no cervical LAD, no thyromegaly or nodules appreciated. TMs are visualized and normal, oropharynx is clear.  RESPIRATORY: respirations non labored, CTA bl, no wheezes, rales, no forced expiratory wheezing.  CARDIOVASCULAR: Heart rate and rhythm were normal. No murmurs, rubs,gallops. There was no peripheral edema. No carotid bruits.  GASTROINTESTINAL: Positive bowel sounds, abdomen is soft, non tender, non distended.     MUSCULOSKELETAL: Muscle mass was normal for age. No joint synovitis or deformity.  LYMPHATIC: There were no enlarged nodes palpable.  SKIN/HAIR/NAILS: Skin color was normal.  No rashes.  NEUROLOGIC: The patient was alert and oriented.  Speech was normal.  There is no facial asymmetry.   PSYCHIATRIC:  Mood and affect were normal.            Signed Electronically by: Margarita Knight MD    "

## 2025-06-10 ENCOUNTER — TRANSFERRED RECORDS (OUTPATIENT)
Dept: HEALTH INFORMATION MANAGEMENT | Facility: CLINIC | Age: 60
End: 2025-06-10
Payer: COMMERCIAL

## 2025-06-24 ENCOUNTER — TRANSFERRED RECORDS (OUTPATIENT)
Dept: HEALTH INFORMATION MANAGEMENT | Facility: CLINIC | Age: 60
End: 2025-06-24
Payer: COMMERCIAL

## 2025-07-09 ENCOUNTER — OFFICE VISIT (OUTPATIENT)
Dept: INTERNAL MEDICINE | Facility: CLINIC | Age: 60
End: 2025-07-09
Payer: COMMERCIAL

## 2025-07-09 VITALS
TEMPERATURE: 97.5 F | BODY MASS INDEX: 25.73 KG/M2 | HEIGHT: 62 IN | HEART RATE: 70 BPM | WEIGHT: 139.8 LBS | OXYGEN SATURATION: 98 % | RESPIRATION RATE: 12 BRPM | DIASTOLIC BLOOD PRESSURE: 74 MMHG | SYSTOLIC BLOOD PRESSURE: 116 MMHG

## 2025-07-09 DIAGNOSIS — I49.3 PVC (PREMATURE VENTRICULAR CONTRACTION): ICD-10-CM

## 2025-07-09 DIAGNOSIS — S83.242S ACUTE MENISCAL TEAR, MEDIAL, LEFT, SEQUELA: ICD-10-CM

## 2025-07-09 DIAGNOSIS — Z01.818 PRE-OP EXAM: Primary | ICD-10-CM

## 2025-07-09 LAB
ANION GAP SERPL CALCULATED.3IONS-SCNC: 9 MMOL/L (ref 7–15)
BASOPHILS # BLD AUTO: 0 10E3/UL (ref 0–0.2)
BASOPHILS NFR BLD AUTO: 0 %
BUN SERPL-MCNC: 15.6 MG/DL (ref 8–23)
CALCIUM SERPL-MCNC: 9.5 MG/DL (ref 8.8–10.4)
CHLORIDE SERPL-SCNC: 104 MMOL/L (ref 98–107)
CREAT SERPL-MCNC: 0.84 MG/DL (ref 0.51–0.95)
EGFRCR SERPLBLD CKD-EPI 2021: 79 ML/MIN/1.73M2
EOSINOPHIL # BLD AUTO: 0.2 10E3/UL (ref 0–0.7)
EOSINOPHIL NFR BLD AUTO: 3 %
ERYTHROCYTE [DISTWIDTH] IN BLOOD BY AUTOMATED COUNT: 11.8 % (ref 10–15)
GLUCOSE SERPL-MCNC: 102 MG/DL (ref 70–99)
HCO3 SERPL-SCNC: 28 MMOL/L (ref 22–29)
HCT VFR BLD AUTO: 41.9 % (ref 35–47)
HGB BLD-MCNC: 13.9 G/DL (ref 11.7–15.7)
IMM GRANULOCYTES # BLD: 0 10E3/UL
IMM GRANULOCYTES NFR BLD: 0 %
LYMPHOCYTES # BLD AUTO: 1.9 10E3/UL (ref 0.8–5.3)
LYMPHOCYTES NFR BLD AUTO: 36 %
MCH RBC QN AUTO: 30.4 PG (ref 26.5–33)
MCHC RBC AUTO-ENTMCNC: 33.2 G/DL (ref 31.5–36.5)
MCV RBC AUTO: 92 FL (ref 78–100)
MONOCYTES # BLD AUTO: 0.3 10E3/UL (ref 0–1.3)
MONOCYTES NFR BLD AUTO: 6 %
NEUTROPHILS # BLD AUTO: 2.9 10E3/UL (ref 1.6–8.3)
NEUTROPHILS NFR BLD AUTO: 55 %
PLATELET # BLD AUTO: 253 10E3/UL (ref 150–450)
POTASSIUM SERPL-SCNC: 4.3 MMOL/L (ref 3.4–5.3)
RBC # BLD AUTO: 4.57 10E6/UL (ref 3.8–5.2)
SODIUM SERPL-SCNC: 141 MMOL/L (ref 135–145)
WBC # BLD AUTO: 5.3 10E3/UL (ref 4–11)

## 2025-07-09 PROCEDURE — 80048 BASIC METABOLIC PNL TOTAL CA: CPT | Performed by: INTERNAL MEDICINE

## 2025-07-09 PROCEDURE — 3078F DIAST BP <80 MM HG: CPT | Performed by: INTERNAL MEDICINE

## 2025-07-09 PROCEDURE — 36415 COLL VENOUS BLD VENIPUNCTURE: CPT | Performed by: INTERNAL MEDICINE

## 2025-07-09 PROCEDURE — 93010 ELECTROCARDIOGRAM REPORT: CPT | Performed by: STUDENT IN AN ORGANIZED HEALTH CARE EDUCATION/TRAINING PROGRAM

## 2025-07-09 PROCEDURE — 85025 COMPLETE CBC W/AUTO DIFF WBC: CPT | Performed by: INTERNAL MEDICINE

## 2025-07-09 PROCEDURE — 93005 ELECTROCARDIOGRAM TRACING: CPT | Performed by: INTERNAL MEDICINE

## 2025-07-09 PROCEDURE — 3074F SYST BP LT 130 MM HG: CPT | Performed by: INTERNAL MEDICINE

## 2025-07-09 PROCEDURE — 99214 OFFICE O/P EST MOD 30 MIN: CPT | Performed by: INTERNAL MEDICINE

## 2025-07-09 NOTE — PROGRESS NOTES
Preoperative Evaluation  [facility] [place]  [address] [place] [address]  Phone: [phone]  Fax: [phone]  Primary Provider: Margarita Knight MD  Pre-op Performing Provider: Margarita Knight MD  Jul 9, 2025 7/6/2025   Surgical Information   What procedure is being done? Left Knee Arthroscopy Medial Meniscectomy   Facility or Hospital where procedure/surgery will be performed: [facility]   Who is doing the procedure / surgery? Dr. Alfie Cabral   Date of surgery / procedure: July 14, 2025   Time of surgery / procedure: 9:30 AM   Where do you plan to recover after surgery? at home with family     Fax number for surgical facility: [phone]    Assessment & Plan     The proposed surgical procedure is considered INTERMEDIATE risk.    Pre-op exam  Medically stable for this surgery.  - EKG 12-lead, tracing only  - CBC with platelets and differential  - Basic metabolic panel  (Ca, Cl, CO2, Creat, Gluc, K, Na, BUN)    Acute meniscal tear, medial, left, sequela  Has been having chronic pain which has been limiting her ability to exercise.  - EKG 12-lead, tracing only  - CBC with platelets and differential  - Basic metabolic panel  (Ca, Cl, CO2, Creat, Gluc, K, Na, BUN)    PVC (premature ventricular contraction)  EKG is normal today.  - CBC with platelets and differential  - Basic metabolic panel  (Ca, Cl, CO2, Creat, Gluc, K, Na, BUN)      Recommendation  Approval given to proceed with proposed procedure, without further diagnostic evaluation.      Subjective   Britney is a 59 year old, presenting for the following:  Pre-Op Exam          7/9/2025     8:44 AM   Additional Questions   Roomed by Gordon ALY RN     HPI:   Britney is a 59-year-old female with history of hyperlipidemia and vitamin D deficiency, PVCs.  She is currently here for preop evaluation for the left knee arthroscopy for medial meniscus  tear.    Previous surgical history significant for left knee ACL repair.  No problems with bleeding clotting or anesthesia, no family history of such.    Currently she does have chronic pain in the left knee due to meniscal tear and has difficulty exercising.    No history of heart disease, in the past she had 1 episode of elevated blood pressure which was due to NSAIDs, blood pressure today is normal.  Generally she is very physically active and in the past was did a lot of walking, currently does crosstraining 3 times a week and denies any chest pains palpitations shortness of breath or lower extremity edema.    No history of asthma smoking, snoring or recent upper respiratory infections.  No fevers chills sore throat or cough.         7/6/2025   Pre-Op Questionnaire   Have you ever had a heart attack or stroke? No   Have you ever had surgery on your heart or blood vessels, such as a stent placement, a coronary artery bypass, or surgery on an artery in your head, neck, heart, or legs? No   Do you have chest pain with activity? No   Do you have a history of heart failure? No   Do you currently have a cold, bronchitis or symptoms of other infection? No   Do you have a cough, shortness of breath, or wheezing? No   Do you or anyone in your family have previous history of blood clots? No   Do you or does anyone in your family have a serious bleeding problem such as prolonged bleeding following surgeries or cuts? No   Have you ever had problems with anemia or been told to take iron pills? No   Have you had any abnormal blood loss such as black, tarry or bloody stools, or abnormal vaginal bleeding? No   Have you ever had a blood transfusion? No   Are you willing to have a blood transfusion if it is medically needed before, during, or after your surgery? Yes   Have you or any of your relatives ever had problems with anesthesia? No   Do you have sleep apnea, excessive snoring or daytime drowsiness? No   Do you have any  artifical heart valves or other implanted medical devices like a pacemaker, defibrillator, or continuous glucose monitor? No   Do you have artificial joints? No   Are you allergic to latex? No       Preoperative Review of       Patient Active Problem List    Diagnosis Date Noted    Adenomatous colon polyp 01/17/2025     Priority: Medium    Hypercholesterolemia      Priority: Medium     Created by Conversion        Sinus Tachycardia      Priority: Medium     Created by Conversion          Past Medical History:   Diagnosis Date    Breast cyst 01/01/2010    Chest pain      Past Surgical History:   Procedure Laterality Date    BREAST SURGERY  April 5, 2022    Bilateral Breast Reduction    COLONOSCOPY  January 2015    ORTHOPEDIC SURGERY  October 18, 2021    Left shoulder rotator cuff repair     Current Outpatient Medications   Medication Sig Dispense Refill    atorvastatin (LIPITOR) 10 MG tablet Take 1 tablet (10 mg) by mouth daily. 90 tablet 3    calcium citrate-vitamin D (CITRACAL+D) 315-200 mg-unit per tablet [CALCIUM CITRATE-VITAMIN D (CITRACAL+D) 315-200 MG-UNIT PER TABLET] Take 1 tablet by mouth 2 (two) times a day.      Coenzyme Q10 (COQ10) 200 MG CAPS       Magnesium Glycinate 665 MG CAPS Take 1 tablet by mouth daily         Allergies   Allergen Reactions    Simvastatin Unknown        Social History     Tobacco Use    Smoking status: Never    Smokeless tobacco: Never   Substance Use Topics    Alcohol use: Yes     Comment: Socially - 1 a week     Family History   Problem Relation Age of Onset    Lung Cancer Father     Hyperlipidemia Mother     Hypertension Mother     Anxiety Disorder Mother     Graves' disease Sister     Attention Deficit Disorder Daughter     Depression Daughter     Hypertension Sister     Thyroid Disease Sister     Hypertension Sister     Hyperlipidemia Sister      History   Drug Use Unknown         Review of systems: No abdominal pain constipation diarrhea or blood in the stool.  She did get  "constipated after previous surgery on narcotics.  No urinary frequency urgency or dysuria.    Objective    /74 (BP Location: Left arm, Patient Position: Sitting, Cuff Size: Adult Regular)   Pulse 70   Temp 97.5  F (36.4  C) (Tympanic)   Resp 12   Ht 1.575 m (5' 2\")   Wt 63.4 kg (139 lb 12.8 oz)   LMP  (LMP Unknown)   SpO2 98%   BMI 25.57 kg/m     Estimated body mass index is 25.57 kg/m  as calculated from the following:    Height as of this encounter: 1.575 m (5' 2\").    Weight as of this encounter: 63.4 kg (139 lb 12.8 oz).  Physical Exam  General: well appearing female, alert and oriented x3  EYES: Eyelids, conjunctiva, and sclera were normal. Pupils were normal.   HEAD, EARS, NOSE, MOUTH, AND THROAT: no cervical LAD, no thyromegaly or nodules appreciated. TMs are visualized and normal, oropharynx is clear.  RESPIRATORY: respirations non labored, CTA bl, no wheezes, rales, no forced expiratory wheezing.  CARDIOVASCULAR: Heart rate and rhythm were normal. No murmurs, rubs,gallops. There was no peripheral edema. No carotid bruits.  GASTROINTESTINAL: Positive bowel sounds, abdomen is soft, non tender, non distended.     MUSCULOSKELETAL: Muscle mass was normal for age. No joint synovitis or deformity.  LYMPHATIC: There were no enlarged nodes palpable.  SKIN/HAIR/NAILS: Skin color was normal.  No rashes.  NEUROLOGIC: The patient was alert and oriented.  Speech was normal.  There is no facial asymmetry.   PSYCHIATRIC:  Mood and affect were normal.        Recent Labs   Lab Test 10/04/24  1040   HGB 14.5         POTASSIUM 4.4   CR 0.75        Diagnostics  Labs pending at this time.  Results will be reviewed when available.   EKG: appears normal, NSR, normal axis, normal intervals, no acute ST/T changes c/w ischemia, no LVH by voltage criteria, unchanged from previous tracings    Revised Cardiac Risk Index (RCRI)  The patient has the following serious cardiovascular risks for perioperative " complications:   - No serious cardiac risks = 0 points     RCRI Interpretation: 0 points: Class I (very low risk - 0.4% complication rate)         Signed Electronically by: Margarita Knight MD  A copy of this evaluation report is provided to the requesting physician.

## 2025-07-10 ENCOUNTER — TELEPHONE (OUTPATIENT)
Dept: INTERNAL MEDICINE | Facility: CLINIC | Age: 60
End: 2025-07-10
Payer: COMMERCIAL

## 2025-07-10 LAB
ATRIAL RATE - MUSE: 64 BPM
DIASTOLIC BLOOD PRESSURE - MUSE: NORMAL MMHG
INTERPRETATION ECG - MUSE: NORMAL
P AXIS - MUSE: 59 DEGREES
PR INTERVAL - MUSE: 176 MS
QRS DURATION - MUSE: 76 MS
QT - MUSE: 402 MS
QTC - MUSE: 414 MS
R AXIS - MUSE: 16 DEGREES
SYSTOLIC BLOOD PRESSURE - MUSE: NORMAL MMHG
T AXIS - MUSE: 49 DEGREES
VENTRICULAR RATE- MUSE: 64 BPM

## 2025-07-10 NOTE — TELEPHONE ENCOUNTER
July 10, 2025    Patient's pre-operative physical documentation and labs have been completed by Dr. Knight.  The pre-operative paperwork was faxed to [facility] at [phone] per instructions from the surgeon.    Sia Galan

## 2025-07-29 ENCOUNTER — TRANSFERRED RECORDS (OUTPATIENT)
Dept: HEALTH INFORMATION MANAGEMENT | Facility: CLINIC | Age: 60
End: 2025-07-29
Payer: COMMERCIAL

## 2025-08-28 ENCOUNTER — TRANSFERRED RECORDS (OUTPATIENT)
Dept: HEALTH INFORMATION MANAGEMENT | Facility: CLINIC | Age: 60
End: 2025-08-28
Payer: COMMERCIAL